# Patient Record
Sex: MALE | Race: WHITE | NOT HISPANIC OR LATINO | Employment: UNEMPLOYED | URBAN - METROPOLITAN AREA
[De-identification: names, ages, dates, MRNs, and addresses within clinical notes are randomized per-mention and may not be internally consistent; named-entity substitution may affect disease eponyms.]

---

## 2017-01-11 ENCOUNTER — RESOLUTE PROFESSIONAL BILLING HOSPITAL PROF FEE (OUTPATIENT)
Dept: CARDIOLOGY | Facility: MEDICAL CENTER | Age: 57
End: 2017-01-11
Payer: MEDICAID

## 2017-01-11 ENCOUNTER — HOSPITAL ENCOUNTER (INPATIENT)
Facility: MEDICAL CENTER | Age: 57
LOS: 1 days | DRG: 316 | End: 2017-01-12
Attending: EMERGENCY MEDICINE | Admitting: INTERNAL MEDICINE
Payer: MEDICAID

## 2017-01-11 ENCOUNTER — APPOINTMENT (OUTPATIENT)
Dept: RADIOLOGY | Facility: MEDICAL CENTER | Age: 57
DRG: 316 | End: 2017-01-11
Attending: EMERGENCY MEDICINE
Payer: MEDICAID

## 2017-01-11 DIAGNOSIS — R10.13 EPIGASTRIC ABDOMINAL PAIN: ICD-10-CM

## 2017-01-11 DIAGNOSIS — R11.2 NAUSEA AND VOMITING, INTRACTABILITY OF VOMITING NOT SPECIFIED, UNSPECIFIED VOMITING TYPE: ICD-10-CM

## 2017-01-11 DIAGNOSIS — D72.829 LEUKOCYTOSIS, UNSPECIFIED TYPE: ICD-10-CM

## 2017-01-11 DIAGNOSIS — R79.89 POSITIVE D-DIMER: ICD-10-CM

## 2017-01-11 DIAGNOSIS — R07.9 CHEST PAIN, UNSPECIFIED TYPE: ICD-10-CM

## 2017-01-11 PROBLEM — F10.10 ALCOHOL ABUSE: Status: ACTIVE | Noted: 2017-01-11

## 2017-01-11 LAB
ALBUMIN SERPL BCP-MCNC: 3.7 G/DL (ref 3.2–4.9)
ALBUMIN SERPL BCP-MCNC: 4.6 G/DL (ref 3.2–4.9)
ALBUMIN/GLOB SERPL: 1.3 G/DL
ALBUMIN/GLOB SERPL: 1.3 G/DL
ALP SERPL-CCNC: 58 U/L (ref 30–99)
ALP SERPL-CCNC: 79 U/L (ref 30–99)
ALT SERPL-CCNC: 18 U/L (ref 2–50)
ALT SERPL-CCNC: 26 U/L (ref 2–50)
ANION GAP SERPL CALC-SCNC: 13 MMOL/L (ref 0–11.9)
ANION GAP SERPL CALC-SCNC: 8 MMOL/L (ref 0–11.9)
APPEARANCE UR: CLEAR
AST SERPL-CCNC: 17 U/L (ref 12–45)
AST SERPL-CCNC: 25 U/L (ref 12–45)
BASOPHILS # BLD AUTO: 0.8 % (ref 0–1.8)
BASOPHILS # BLD: 0.13 K/UL (ref 0–0.12)
BILIRUB SERPL-MCNC: 0.3 MG/DL (ref 0.1–1.5)
BILIRUB SERPL-MCNC: 0.5 MG/DL (ref 0.1–1.5)
BILIRUB UR QL STRIP.AUTO: NEGATIVE
BUN SERPL-MCNC: 10 MG/DL (ref 8–22)
BUN SERPL-MCNC: 11 MG/DL (ref 8–22)
CALCIUM SERPL-MCNC: 8.6 MG/DL (ref 8.5–10.5)
CALCIUM SERPL-MCNC: 9.5 MG/DL (ref 8.5–10.5)
CHLORIDE SERPL-SCNC: 100 MMOL/L (ref 96–112)
CHLORIDE SERPL-SCNC: 107 MMOL/L (ref 96–112)
CO2 SERPL-SCNC: 22 MMOL/L (ref 20–33)
CO2 SERPL-SCNC: 22 MMOL/L (ref 20–33)
COLOR UR: YELLOW
CREAT SERPL-MCNC: 0.78 MG/DL (ref 0.5–1.4)
CREAT SERPL-MCNC: 0.81 MG/DL (ref 0.5–1.4)
CRP SERPL HS-MCNC: 1.4 MG/L (ref 0–7.5)
DEPRECATED D DIMER PPP IA-ACNC: 299 NG/ML(D-DU)
EKG IMPRESSION: NORMAL
EKG IMPRESSION: NORMAL
EOSINOPHIL # BLD AUTO: 0.1 K/UL (ref 0–0.51)
EOSINOPHIL NFR BLD: 0.6 % (ref 0–6.9)
ERYTHROCYTE [DISTWIDTH] IN BLOOD BY AUTOMATED COUNT: 46.7 FL (ref 35.9–50)
ERYTHROCYTE [SEDIMENTATION RATE] IN BLOOD BY WESTERGREN METHOD: 13 MM/HOUR (ref 0–20)
FLUAV H1 2009 PAND RNA SPEC QL NAA+PROBE: NOT DETECTED
FLUAV RNA SPEC QL NAA+PROBE: NEGATIVE
FLUBV RNA SPEC QL NAA+PROBE: NEGATIVE
GFR SERPL CREATININE-BSD FRML MDRD: >60 ML/MIN/1.73 M 2
GFR SERPL CREATININE-BSD FRML MDRD: >60 ML/MIN/1.73 M 2
GLOBULIN SER CALC-MCNC: 2.9 G/DL (ref 1.9–3.5)
GLOBULIN SER CALC-MCNC: 3.6 G/DL (ref 1.9–3.5)
GLUCOSE SERPL-MCNC: 115 MG/DL (ref 65–99)
GLUCOSE SERPL-MCNC: 91 MG/DL (ref 65–99)
GLUCOSE UR STRIP.AUTO-MCNC: NEGATIVE MG/DL
HCT VFR BLD AUTO: 46 % (ref 42–52)
HGB BLD-MCNC: 16.1 G/DL (ref 14–18)
IMM GRANULOCYTES # BLD AUTO: 0.07 K/UL (ref 0–0.11)
IMM GRANULOCYTES NFR BLD AUTO: 0.5 % (ref 0–0.9)
KETONES UR STRIP.AUTO-MCNC: 20 MG/DL
LACTATE BLD-SCNC: 1.7 MMOL/L (ref 0.5–2)
LACTATE BLD-SCNC: 2.1 MMOL/L (ref 0.5–2)
LACTATE BLD-SCNC: 2.4 MMOL/L (ref 0.5–2)
LEUKOCYTE ESTERASE UR QL STRIP.AUTO: NEGATIVE
LIPASE SERPL-CCNC: 25 U/L (ref 11–82)
LV EJECT FRACT  99904: 55
LV EJECT FRACT MOD 2C 99903: 49.11
LV EJECT FRACT MOD 4C 99902: 59.14
LV EJECT FRACT MOD BP 99901: 57.16
LYMPHOCYTES # BLD AUTO: 1.94 K/UL (ref 1–4.8)
LYMPHOCYTES NFR BLD: 12.6 % (ref 22–41)
MAGNESIUM SERPL-MCNC: 2.1 MG/DL (ref 1.5–2.5)
MCH RBC QN AUTO: 34.3 PG (ref 27–33)
MCHC RBC AUTO-ENTMCNC: 35 G/DL (ref 33.7–35.3)
MCV RBC AUTO: 98.1 FL (ref 81.4–97.8)
MICRO URNS: ABNORMAL
MONOCYTES # BLD AUTO: 1.11 K/UL (ref 0–0.85)
MONOCYTES NFR BLD AUTO: 7.2 % (ref 0–13.4)
NEUTROPHILS # BLD AUTO: 12.07 K/UL (ref 1.82–7.42)
NEUTROPHILS NFR BLD: 78.3 % (ref 44–72)
NITRITE UR QL STRIP.AUTO: NEGATIVE
NRBC # BLD AUTO: 0 K/UL
NRBC BLD AUTO-RTO: 0 /100 WBC
PH UR STRIP.AUTO: 5 [PH]
PHOSPHATE SERPL-MCNC: 2.4 MG/DL (ref 2.5–4.5)
PLATELET # BLD AUTO: 252 K/UL (ref 164–446)
PMV BLD AUTO: 9.6 FL (ref 9–12.9)
POTASSIUM SERPL-SCNC: 4.3 MMOL/L (ref 3.6–5.5)
POTASSIUM SERPL-SCNC: 4.4 MMOL/L (ref 3.6–5.5)
PROT SERPL-MCNC: 6.6 G/DL (ref 6–8.2)
PROT SERPL-MCNC: 8.2 G/DL (ref 6–8.2)
PROT UR QL STRIP: NEGATIVE MG/DL
RBC # BLD AUTO: 4.69 M/UL (ref 4.7–6.1)
RBC UR QL AUTO: NEGATIVE
SODIUM SERPL-SCNC: 135 MMOL/L (ref 135–145)
SODIUM SERPL-SCNC: 137 MMOL/L (ref 135–145)
SP GR UR STRIP.AUTO: >1.035
TROPONIN I SERPL-MCNC: <0.01 NG/ML (ref 0–0.04)
WBC # BLD AUTO: 15.4 K/UL (ref 4.8–10.8)

## 2017-01-11 PROCEDURE — 700102 HCHG RX REV CODE 250 W/ 637 OVERRIDE(OP): Performed by: STUDENT IN AN ORGANIZED HEALTH CARE EDUCATION/TRAINING PROGRAM

## 2017-01-11 PROCEDURE — 700111 HCHG RX REV CODE 636 W/ 250 OVERRIDE (IP): Performed by: STUDENT IN AN ORGANIZED HEALTH CARE EDUCATION/TRAINING PROGRAM

## 2017-01-11 PROCEDURE — 85379 FIBRIN DEGRADATION QUANT: CPT

## 2017-01-11 PROCEDURE — 700102 HCHG RX REV CODE 250 W/ 637 OVERRIDE(OP): Performed by: EMERGENCY MEDICINE

## 2017-01-11 PROCEDURE — 96375 TX/PRO/DX INJ NEW DRUG ADDON: CPT

## 2017-01-11 PROCEDURE — 76705 ECHO EXAM OF ABDOMEN: CPT

## 2017-01-11 PROCEDURE — 93306 TTE W/DOPPLER COMPLETE: CPT | Mod: 26 | Performed by: INTERNAL MEDICINE

## 2017-01-11 PROCEDURE — 74177 CT ABD & PELVIS W/CONTRAST: CPT

## 2017-01-11 PROCEDURE — 700105 HCHG RX REV CODE 258: Performed by: EMERGENCY MEDICINE

## 2017-01-11 PROCEDURE — 700102 HCHG RX REV CODE 250 W/ 637 OVERRIDE(OP): Performed by: INTERNAL MEDICINE

## 2017-01-11 PROCEDURE — 84484 ASSAY OF TROPONIN QUANT: CPT

## 2017-01-11 PROCEDURE — 700102 HCHG RX REV CODE 250 W/ 637 OVERRIDE(OP)

## 2017-01-11 PROCEDURE — 302128 INFUSION PUMP: Performed by: INTERNAL MEDICINE

## 2017-01-11 PROCEDURE — 99255 IP/OBS CONSLTJ NEW/EST HI 80: CPT | Performed by: INTERNAL MEDICINE

## 2017-01-11 PROCEDURE — 80053 COMPREHEN METABOLIC PANEL: CPT

## 2017-01-11 PROCEDURE — 93010 ELECTROCARDIOGRAM REPORT: CPT | Performed by: INTERNAL MEDICINE

## 2017-01-11 PROCEDURE — 96374 THER/PROPH/DIAG INJ IV PUSH: CPT

## 2017-01-11 PROCEDURE — A9270 NON-COVERED ITEM OR SERVICE: HCPCS | Performed by: INTERNAL MEDICINE

## 2017-01-11 PROCEDURE — 93306 TTE W/DOPPLER COMPLETE: CPT

## 2017-01-11 PROCEDURE — A9270 NON-COVERED ITEM OR SERVICE: HCPCS

## 2017-01-11 PROCEDURE — 700117 HCHG RX CONTRAST REV CODE 255: Performed by: EMERGENCY MEDICINE

## 2017-01-11 PROCEDURE — 87040 BLOOD CULTURE FOR BACTERIA: CPT

## 2017-01-11 PROCEDURE — 36415 COLL VENOUS BLD VENIPUNCTURE: CPT

## 2017-01-11 PROCEDURE — 83605 ASSAY OF LACTIC ACID: CPT

## 2017-01-11 PROCEDURE — 74022 RADEX COMPL AQT ABD SERIES: CPT

## 2017-01-11 PROCEDURE — 86141 C-REACTIVE PROTEIN HS: CPT

## 2017-01-11 PROCEDURE — 87077 CULTURE AEROBIC IDENTIFY: CPT

## 2017-01-11 PROCEDURE — 770020 HCHG ROOM/CARE - TELE (206)

## 2017-01-11 PROCEDURE — 700105 HCHG RX REV CODE 258: Performed by: INTERNAL MEDICINE

## 2017-01-11 PROCEDURE — 99285 EMERGENCY DEPT VISIT HI MDM: CPT

## 2017-01-11 PROCEDURE — 84100 ASSAY OF PHOSPHORUS: CPT

## 2017-01-11 PROCEDURE — A9270 NON-COVERED ITEM OR SERVICE: HCPCS | Performed by: EMERGENCY MEDICINE

## 2017-01-11 PROCEDURE — 93005 ELECTROCARDIOGRAM TRACING: CPT | Performed by: INTERNAL MEDICINE

## 2017-01-11 PROCEDURE — 85652 RBC SED RATE AUTOMATED: CPT

## 2017-01-11 PROCEDURE — 87502 INFLUENZA DNA AMP PROBE: CPT

## 2017-01-11 PROCEDURE — 83690 ASSAY OF LIPASE: CPT

## 2017-01-11 PROCEDURE — 85025 COMPLETE CBC W/AUTO DIFF WBC: CPT

## 2017-01-11 PROCEDURE — 94760 N-INVAS EAR/PLS OXIMETRY 1: CPT

## 2017-01-11 PROCEDURE — 83735 ASSAY OF MAGNESIUM: CPT

## 2017-01-11 PROCEDURE — A9270 NON-COVERED ITEM OR SERVICE: HCPCS | Performed by: STUDENT IN AN ORGANIZED HEALTH CARE EDUCATION/TRAINING PROGRAM

## 2017-01-11 PROCEDURE — 81003 URINALYSIS AUTO W/O SCOPE: CPT

## 2017-01-11 PROCEDURE — 87503 INFLUENZA DNA AMP PROB ADDL: CPT

## 2017-01-11 PROCEDURE — 93005 ELECTROCARDIOGRAM TRACING: CPT | Performed by: EMERGENCY MEDICINE

## 2017-01-11 PROCEDURE — 700111 HCHG RX REV CODE 636 W/ 250 OVERRIDE (IP): Performed by: EMERGENCY MEDICINE

## 2017-01-11 PROCEDURE — 93005 ELECTROCARDIOGRAM TRACING: CPT

## 2017-01-11 RX ORDER — SODIUM CHLORIDE 9 MG/ML
INJECTION, SOLUTION INTRAVENOUS CONTINUOUS
Status: DISCONTINUED | OUTPATIENT
Start: 2017-01-11 | End: 2017-01-11

## 2017-01-11 RX ORDER — ENEMA 19; 7 G/133ML; G/133ML
1 ENEMA RECTAL
Status: DISCONTINUED | OUTPATIENT
Start: 2017-01-11 | End: 2017-01-12 | Stop reason: HOSPADM

## 2017-01-11 RX ORDER — BISACODYL 10 MG
10 SUPPOSITORY, RECTAL RECTAL
Status: DISCONTINUED | OUTPATIENT
Start: 2017-01-11 | End: 2017-01-12 | Stop reason: HOSPADM

## 2017-01-11 RX ORDER — SODIUM CHLORIDE 9 MG/ML
1000 INJECTION, SOLUTION INTRAVENOUS ONCE
Status: COMPLETED | OUTPATIENT
Start: 2017-01-11 | End: 2017-01-11

## 2017-01-11 RX ORDER — AMOXICILLIN 250 MG
1 CAPSULE ORAL NIGHTLY
Status: DISCONTINUED | OUTPATIENT
Start: 2017-01-11 | End: 2017-01-12 | Stop reason: HOSPADM

## 2017-01-11 RX ORDER — COLCHICINE 0.6 MG/1
0.6 TABLET ORAL 2 TIMES DAILY
Status: DISCONTINUED | OUTPATIENT
Start: 2017-01-11 | End: 2017-01-12 | Stop reason: HOSPADM

## 2017-01-11 RX ORDER — AMOXICILLIN 250 MG
1 CAPSULE ORAL
Status: DISCONTINUED | OUTPATIENT
Start: 2017-01-11 | End: 2017-01-12 | Stop reason: HOSPADM

## 2017-01-11 RX ORDER — SODIUM CHLORIDE 9 MG/ML
30 INJECTION, SOLUTION INTRAVENOUS
Status: DISCONTINUED | OUTPATIENT
Start: 2017-01-11 | End: 2017-01-11

## 2017-01-11 RX ORDER — LACTULOSE 20 G/30ML
30 SOLUTION ORAL
Status: DISCONTINUED | OUTPATIENT
Start: 2017-01-11 | End: 2017-01-12 | Stop reason: HOSPADM

## 2017-01-11 RX ORDER — ACETAMINOPHEN 325 MG/1
650 TABLET ORAL EVERY 6 HOURS PRN
Status: DISCONTINUED | OUTPATIENT
Start: 2017-01-11 | End: 2017-01-12 | Stop reason: HOSPADM

## 2017-01-11 RX ORDER — ASPIRIN 81 MG/1
324 TABLET, CHEWABLE ORAL ONCE
Status: COMPLETED | OUTPATIENT
Start: 2017-01-11 | End: 2017-01-11

## 2017-01-11 RX ORDER — OMEPRAZOLE 20 MG/1
20 CAPSULE, DELAYED RELEASE ORAL DAILY
Status: DISCONTINUED | OUTPATIENT
Start: 2017-01-11 | End: 2017-01-12 | Stop reason: HOSPADM

## 2017-01-11 RX ORDER — MORPHINE SULFATE 4 MG/ML
4 INJECTION, SOLUTION INTRAMUSCULAR; INTRAVENOUS ONCE
Status: COMPLETED | OUTPATIENT
Start: 2017-01-11 | End: 2017-01-11

## 2017-01-11 RX ORDER — LABETALOL HYDROCHLORIDE 5 MG/ML
10 INJECTION, SOLUTION INTRAVENOUS EVERY 4 HOURS PRN
Status: DISCONTINUED | OUTPATIENT
Start: 2017-01-11 | End: 2017-01-12 | Stop reason: HOSPADM

## 2017-01-11 RX ORDER — IBUPROFEN 600 MG/1
600 TABLET ORAL 3 TIMES DAILY
Status: DISCONTINUED | OUTPATIENT
Start: 2017-01-11 | End: 2017-01-12 | Stop reason: HOSPADM

## 2017-01-11 RX ORDER — SODIUM CHLORIDE 9 MG/ML
INJECTION, SOLUTION INTRAVENOUS CONTINUOUS
Status: DISCONTINUED | OUTPATIENT
Start: 2017-01-11 | End: 2017-01-12 | Stop reason: HOSPADM

## 2017-01-11 RX ORDER — DOCUSATE SODIUM 100 MG/1
100 CAPSULE, LIQUID FILLED ORAL EVERY MORNING
Status: DISCONTINUED | OUTPATIENT
Start: 2017-01-11 | End: 2017-01-12 | Stop reason: HOSPADM

## 2017-01-11 RX ORDER — ONDANSETRON 2 MG/ML
4 INJECTION INTRAMUSCULAR; INTRAVENOUS ONCE
Status: COMPLETED | OUTPATIENT
Start: 2017-01-11 | End: 2017-01-11

## 2017-01-11 RX ORDER — SODIUM CHLORIDE 9 MG/ML
500 INJECTION, SOLUTION INTRAVENOUS
Status: DISCONTINUED | OUTPATIENT
Start: 2017-01-11 | End: 2017-01-11

## 2017-01-11 RX ADMIN — MORPHINE SULFATE 4 MG: 4 INJECTION INTRAVENOUS at 03:28

## 2017-01-11 RX ADMIN — NICOTINE 7 MG: 7 PATCH TRANSDERMAL at 21:37

## 2017-01-11 RX ADMIN — SODIUM CHLORIDE 1000 ML: 9 INJECTION, SOLUTION INTRAVENOUS at 05:44

## 2017-01-11 RX ADMIN — IOHEXOL 100 ML: 350 INJECTION, SOLUTION INTRAVENOUS at 04:52

## 2017-01-11 RX ADMIN — ACETAMINOPHEN 650 MG: 325 TABLET, FILM COATED ORAL at 17:42

## 2017-01-11 RX ADMIN — SODIUM CHLORIDE 1000 ML: 9 INJECTION, SOLUTION INTRAVENOUS at 04:00

## 2017-01-11 RX ADMIN — ASPIRIN 324 MG: 81 TABLET, CHEWABLE ORAL at 01:28

## 2017-01-11 RX ADMIN — COLCHICINE 0.6 MG: 0.6 TABLET, FILM COATED ORAL at 21:41

## 2017-01-11 RX ADMIN — ENOXAPARIN SODIUM 40 MG: 100 INJECTION SUBCUTANEOUS at 17:42

## 2017-01-11 RX ADMIN — SODIUM CHLORIDE: 9 INJECTION, SOLUTION INTRAVENOUS at 11:01

## 2017-01-11 RX ADMIN — SODIUM CHLORIDE: 9 INJECTION, SOLUTION INTRAVENOUS at 19:13

## 2017-01-11 RX ADMIN — LIDOCAINE HYDROCHLORIDE 30 ML: 20 SOLUTION OROPHARYNGEAL at 05:41

## 2017-01-11 RX ADMIN — IBUPROFEN 600 MG: 600 TABLET, FILM COATED ORAL at 21:41

## 2017-01-11 RX ADMIN — ONDANSETRON 4 MG: 2 INJECTION, SOLUTION INTRAMUSCULAR; INTRAVENOUS at 03:28

## 2017-01-11 RX ADMIN — OMEPRAZOLE 20 MG: 20 CAPSULE, DELAYED RELEASE ORAL at 17:42

## 2017-01-11 RX ADMIN — SODIUM CHLORIDE 1000 ML: 9 INJECTION, SOLUTION INTRAVENOUS at 03:28

## 2017-01-11 ASSESSMENT — LIFESTYLE VARIABLES
PAROXYSMAL SWEATS: NO SWEAT VISIBLE
TOTAL SCORE: 2
DOES PATIENT WANT TO TALK TO SOMEONE ABOUT QUITTING: YES
TOTAL SCORE: 2
SUBSTANCE_ABUSE: 0
EVER_SMOKED: YES
ALCOHOL_USE: YES
VISUAL DISTURBANCES: NOT PRESENT
AGITATION: NORMAL ACTIVITY
ANXIETY: MILDLY ANXIOUS
HAVE PEOPLE ANNOYED YOU BY CRITICIZING YOUR DRINKING: NO
TREMOR: *
DOES PATIENT WANT TO STOP DRINKING: YES
EVER FELT BAD OR GUILTY ABOUT YOUR DRINKING: NO
HAVE YOU EVER FELT YOU SHOULD CUT DOWN ON YOUR DRINKING: YES
ORIENTATION AND CLOUDING OF SENSORIUM: ORIENTED AND CAN DO SERIAL ADDITIONS
TOTAL SCORE: 2
EVER HAD A DRINK FIRST THING IN THE MORNING TO STEADY YOUR NERVES TO GET RID OF A HANGOVER: YES
HEADACHE, FULLNESS IN HEAD: VERY MILD
CONSUMPTION TOTAL: POSITIVE
HOW MANY TIMES IN THE PAST YEAR HAVE YOU HAD 5 OR MORE DRINKS IN A DAY: 5
AVERAGE NUMBER OF DAYS PER WEEK YOU HAVE A DRINK CONTAINING ALCOHOL: 5
TOTAL SCORE: 4
EVER_SMOKED: YES
ON A TYPICAL DAY WHEN YOU DRINK ALCOHOL HOW MANY DRINKS DO YOU HAVE: 3
AUDITORY DISTURBANCES: NOT PRESENT
NAUSEA AND VOMITING: NO NAUSEA AND NO VOMITING

## 2017-01-11 ASSESSMENT — ENCOUNTER SYMPTOMS
DIARRHEA: 0
HEADACHES: 0
FEVER: 0
COUGH: 1
CONSTIPATION: 0
PND: 0
ABDOMINAL PAIN: 1
SPUTUM PRODUCTION: 0
BACK PAIN: 0
DOUBLE VISION: 0
HEARTBURN: 0
HEMOPTYSIS: 0
NECK PAIN: 0
TREMORS: 0
TINGLING: 0
CHILLS: 0
ORTHOPNEA: 0
PHOTOPHOBIA: 0
MYALGIAS: 0
PALPITATIONS: 0
SHORTNESS OF BREATH: 0
CLAUDICATION: 0
DEPRESSION: 0
VOMITING: 1
BLURRED VISION: 0
NAUSEA: 1
WHEEZING: 0
DIZZINESS: 0

## 2017-01-11 ASSESSMENT — PAIN SCALES - GENERAL
PAINLEVEL_OUTOF10: 10
PAINLEVEL_OUTOF10: 3
PAINLEVEL_OUTOF10: 10
PAINLEVEL_OUTOF10: 2

## 2017-01-11 ASSESSMENT — COPD QUESTIONNAIRES
DURING THE PAST 4 WEEKS HOW MUCH DID YOU FEEL SHORT OF BREATH: NONE/LITTLE OF THE TIME
COPD SCREENING SCORE: 3
HAVE YOU SMOKED AT LEAST 100 CIGARETTES IN YOUR ENTIRE LIFE: YES
DO YOU EVER COUGH UP ANY MUCUS OR PHLEGM?: NO/ONLY WITH OCCASIONAL COLDS OR INFECTIONS

## 2017-01-11 NOTE — CONSULTS
Reason of Consult: Abdominal pain and vometing    Consulting Physician: Dr. Rdz    HPI:  56M alcoholic with HTN and smoking, intermittently homeless, presents with constant epigastric pain worsening for 10-12 hours associated with N/V. On arrival noted to have normal troponin and an elevated WBC. Repeat ekg was felt concerning for ACS. Patient has voluntary and involuntary guarding of his abdomen with rebound tenderness. Belly pain radiates to right chest slightly, and is made much worse with respiration and positional changes.    History reviewed. No pertinent past medical history.    Past Surgical History   Procedure Laterality Date   • Hernia repair       inguinal hernia repair at age 4 and age 7    • Other     • Submandible abscess incision and drainage  3/25/2012     Performed by JORGE LUIS OROSCO at SURGERY [facility] ORS   • Dental extraction(s)  3/25/2012     Performed by JORGE LUIS OROSCO at SURGERY [facility] OR[facility]       No current facility-administered medications on file prior to encounter.     Current Outpatient Prescriptions on File Prior to Encounter   Medication Sig Dispense Refill   • lisinopril (PRINIVIL) 5 MG TABS Take 1 Tab by mouth every day. 30 Each 0   • nicotine (NICODERM) 21 MG/24HR PT24 Apply 1 Patch to skin as directed every day. 30 Each 0   • thiamine 100 MG tablet Take 1 Tab by mouth every day. 30 Each 0   • folic acid (FOLVITE) 1 MG TABS Take 1 Tab by mouth every day. 30 Each 0   • amoxicillin-clavulanate (AUGMENTIN) 500-125 MG TABS Take 1 Tab by mouth 3 times a day. 15 Each 0   • metoprolol (LOPRESSOR) 25 MG TABS Take 1 Tab by mouth 2 times a day. 60 Each 0       Current Facility-Administered Medications   Medication Dose Frequency Provider Last Rate Last Dose   • NS infusion 1,000 mL  1,000 mL Once Thien Hidalgo M.D.       • morphine (pf) 4 mg/ml injection 4 mg  4 mg Once Thien Hidalgo M.D.       • ondansetron (ZOFRAN) syringe/vial injection 4 mg  4 mg Once Thien WHITT  "BRODERICK Hidalgo         Last reviewed on 1/11/2017  1:31 AM by Kami Orozco R.N.    Review of patient's allergies indicates no known allergies.    History reviewed. No pertinent family history.    ROS: As HPI other reviewed and negative     Physical Exam   Blood pressure 111/79, pulse 107, temperature 36.2 °C (97.2 °F), resp. rate 34, height 1.854 m (6' 1\"), weight 65.9 kg (145 lb 4.5 oz), SpO2 93 %.    Constitutional: Elderly, thin. Appears well-developed.   HENT: Normocephalic and atraumatic. No scleral icterus.   Neck: No JVD present.   Cardiovascular: Normal rate. Exam reveals no gallop and no friction rub. No murmur heard.   Pulmonary/Chest: CTAB   Abdominal: Firm, TTP RUQ/RLQ with guarding and rebound tenderness, BS reduced but present.  Musculoskeletal: Exhibits no edema. Pulses present.  Skin: Skin is warm and dry.   Neuro: Non-focal, CN 2-12 intact grossly    No intake or output data in the 24 hours ending 01/11/17 0328    Recent Labs      01/11/17   0051   WBC  15.4*   RBC  4.69*   HEMOGLOBIN  16.1   HEMATOCRIT  46.0   MCV  98.1*   MCH  34.3*   MCHC  35.0   RDW  46.7   PLATELETCT  252   MPV  9.6     Recent Labs      01/11/17   0051   SODIUM  135   POTASSIUM  4.3   CHLORIDE  100   CO2  22   GLUCOSE  91   BUN  10   CREATININE  0.81   CALCIUM  9.5             Recent Labs      01/11/17   0051   TROPONINI  <0.01           EKG (1/11/2017):  I have personally reviewed the EKG this visit and discussed with the patient. It shows  with borderline ST elevation inferior, lateral and anterior with MT elevation in aVR. Does not meet STEMI criteria at the moment.    Imaging reviewed    Impressions:  1. Acute abdominal pain  2. Tobacco abuse  3. HTN    Recommendations:  His ECG is more reminiscent of pericarditis and he has had 10-12 hours of symptoms without an elevation of his troponins. No significant chest pain, but clear acute abdominal pain.    1. Repeat troponins  2. Evaluate acute abdomen  3. Echo  4. " Aspirin    Further recommendations based on above.      Thank you for this interesting consultation. It was my pleasure to see Heriberto Jenkins today.

## 2017-01-11 NOTE — ED PROVIDER NOTES
"ED Provider Note    CHIEF COMPLAINT  Chief Complaint   Patient presents with   • Abdominal Cramping     pt states \"abdominal cramping after I ate some chicken I didn't cook all the way.\"   • Vomiting     x1       HPI  Heriberto Jenkins is a 56 y.o. male who presents severe epigastric abdominal pain radiating up to his chest as well as nausea and vomiting. Patient thinks that this started after he ate some chicken that was not cooked all the way. Patient's describes the pain as a sharp pain associated with some slight shortness of breath. He says he has radiation up to the left side of his neck and his left shoulder as well. He denies any diaphoresis. He is currently nauseated and has thrown up several times. He denies any melena or hematochezia, any blood in his vomit. Nothing seems to make the pain better or worse. He's never had pain like this before. Patient does have a history of alcohol abuse but denies any history of gastritis or esophageal varices. He denies any history of coronary artery disease, pulmonary embolism, you not leg swelling or calf pain.    REVIEW OF SYSTEMS  See HPI for further details. All other systems are negative.     PAST MEDICAL HISTORY       SOCIAL HISTORY  Social History     Social History Main Topics   • Smoking status: Current Every Day Smoker -- 1.00 packs/day     Types: Cigarettes   • Smokeless tobacco: Not on file   • Alcohol Use: Yes      Comment: daily beer approx 6 to 7 and vodka (\"(If I have no beer\") approx 1/4 bottle   • Drug Use: Not on file   • Sexual Activity: Not on file       SURGICAL HISTORY   has past surgical history that includes hernia repair; other; submandible abscess incision and drainage (3/25/2012); and dental extraction(s) (3/25/2012).    CURRENT MEDICATIONS  Home Medications     Reviewed by Kami Orozco R.N. (Registered Nurse) on 01/11/17 at 0131  Med List Status: Partial    Medication Last Dose Status    amoxicillin-clavulanate (AUGMENTIN) 500-125 MG " "TABS not taking Active    folic acid (FOLVITE) 1 MG TABS not taking Active    lisinopril (PRINIVIL) 5 MG TABS not taking Active    metoprolol (LOPRESSOR) 25 MG TABS not taking Active    nicotine (NICODERM) 21 MG/24HR PT24 not taking Active    thiamine 100 MG tablet not taking Active                ALLERGIES  No Known Allergies    PHYSICAL EXAM  VITAL SIGNS: /79 mmHg  Pulse 113  Temp(Src) 37.9 °C (100.2 °F)  Resp 32  Ht 1.854 m (6' 1\")  Wt 65.9 kg (145 lb 4.5 oz)  BMI 19.17 kg/m2  SpO2 97%   Constitutional: Well developed, Well nourished, moderate distress.   HENT: Normocephalic, Atraumatic, Oropharynx moist, No oral exudates.   Eyes: Conjunctiva normal, No discharge.   Neck: Supple, No stridor, no carotid bruit  Cardiovascular: Tachycardia No murmurs, equal pulses.   Pulmonary: Normal breath sounds, No respiratory distress, No wheezing, No rales, No rhonchi.  Chest: No chest wall tenderness or deformity.   Abdomen:Soft, patient has moderate tenderness in the epigastric and right upper quadrant, questionable Barrow sign No  masses, no rebound, no guarding.   Back: No CVA tenderness.   Musculoskeletal: No major deformities noted, No tenderness.   Skin: Warm, Dry, No erythema, No rash.   Neurologic: Alert & oriented x 3, Normal motor function,  No focal deficits noted.   Psychiatric: Affect normal, Judgment normal, Mood normal.        EKG  1st EKG done at 00 50 8 AM shows sinus tachycardia rate of 107, slightly leftward axis, slight ST elevation less than 1 mm, in lead 2, 3 and aVF. He also has ST elevation in V5, V6 with some T-wave flattening in aVL compared to previous EKG done March 25, 2012 ST elevation slightly new T-wave inversion is also new. EKG changes possible early ischemia.     Repeat EKG done at 3 AM shows slightly increased ST elevation, 2, V5, V6 with some slight ST depression in aVR continue T-wave inversion in aVL interpretation possible inferior lateral acute " MI    RADIOLOGY/PROCEDURES  US-GALLBLADDER   Final Result         1.  Mild hepatomegaly with echogenic liver suggests fatty change versus fibrosis.   2.  Mild atherosclerosis.   3.  Borderline gallbladder wall thickening, otherwise unremarkable gallbladder         CT-ABDOMEN-PELVIS WITH   Final Result         1.  No acute abnormality.   2.  Trace pericardial effusion   3.  Hepatomegaly and diffuse hepatic steatosis.   4.  Atherosclerosis and atherosclerotic coronary artery disease   5.  Diverticulosis      DX-ABDOMEN COMPLETE WITH AP OR PA CXR   Final Result         1.  No acute cardiopulmonary disease is evident.   2.  Hyperexpansion of lungs compatible with changes of COPD.   3.  Nonspecific bowel gas pattern.          Laboratory tests  Results for orders placed or performed during the hospital encounter of 01/11/17   CBC WITH DIFFERENTIAL   Result Value Ref Range    WBC 15.4 (H) 4.8 - 10.8 K/uL    RBC 4.69 (L) 4.70 - 6.10 M/uL    Hemoglobin 16.1 14.0 - 18.0 g/dL    Hematocrit 46.0 42.0 - 52.0 %    MCV 98.1 (H) 81.4 - 97.8 fL    MCH 34.3 (H) 27.0 - 33.0 pg    MCHC 35.0 33.7 - 35.3 g/dL    RDW 46.7 35.9 - 50.0 fL    Platelet Count 252 164 - 446 K/uL    MPV 9.6 9.0 - 12.9 fL    Neutrophils-Polys 78.30 (H) 44.00 - 72.00 %    Lymphocytes 12.60 (L) 22.00 - 41.00 %    Monocytes 7.20 0.00 - 13.40 %    Eosinophils 0.60 0.00 - 6.90 %    Basophils 0.80 0.00 - 1.80 %    Immature Granulocytes 0.50 0.00 - 0.90 %    Nucleated RBC 0.00 /100 WBC    Neutrophils (Absolute) 12.07 (H) 1.82 - 7.42 K/uL    Lymphs (Absolute) 1.94 1.00 - 4.80 K/uL    Monos (Absolute) 1.11 (H) 0.00 - 0.85 K/uL    Eos (Absolute) 0.10 0.00 - 0.51 K/uL    Baso (Absolute) 0.13 (H) 0.00 - 0.12 K/uL    Immature Granulocytes (abs) 0.07 0.00 - 0.11 K/uL    NRBC (Absolute) 0.00 K/uL   COMP METABOLIC PANEL   Result Value Ref Range    Sodium 135 135 - 145 mmol/L    Potassium 4.3 3.6 - 5.5 mmol/L    Chloride 100 96 - 112 mmol/L    Co2 22 20 - 33 mmol/L    Anion Gap  13.0 (H) 0.0 - 11.9    Glucose 91 65 - 99 mg/dL    Bun 10 8 - 22 mg/dL    Creatinine 0.81 0.50 - 1.40 mg/dL    Calcium 9.5 8.5 - 10.5 mg/dL    AST(SGOT) 25 12 - 45 U/L    ALT(SGPT) 26 2 - 50 U/L    Alkaline Phosphatase 79 30 - 99 U/L    Total Bilirubin 0.3 0.1 - 1.5 mg/dL    Albumin 4.6 3.2 - 4.9 g/dL    Total Protein 8.2 6.0 - 8.2 g/dL    Globulin 3.6 (H) 1.9 - 3.5 g/dL    A-G Ratio 1.3 g/dL   LIPASE   Result Value Ref Range    Lipase 25 11 - 82 U/L   TROPONIN   Result Value Ref Range    Troponin I <0.01 0.00 - 0.04 ng/mL   ESTIMATED GFR   Result Value Ref Range    GFR If African American >60 >60 mL/min/1.73 m 2    GFR If Non African American >60 >60 mL/min/1.73 m 2   TROPONIN   Result Value Ref Range    Troponin I <0.01 0.00 - 0.04 ng/mL   D-DIMER   Result Value Ref Range    D-Dimer Screen 299 (H) <250 ng/mL(D-DU)   LACTIC ACID   Result Value Ref Range    Lactic Acid 2.4 (H) 0.5 - 2.0 mmol/L   EKG (ER)   Result Value Ref Range    Report       [facility]    Test Date:  2017  Pt Name:    MINA VARGHESE              Department: ER  MRN:        [id number]                      Room:  Gender:     M                            Technician: [id number]  :        1960                   Requested By:ER TRIAGE PROTOCOL  Order #:    [id number]                    Reading MD:    Measurements  Intervals                                Axis  Rate:       107                          P:          50  VT:         172                          QRS:        0  QRSD:       80                           T:          70  QT:         348  QTc:        465    Interpretive Statements  SINUS TACHYCARDIA  ST ELEVATION, PROBABLE INFERIOR INJURY  BORDERLINE R WAVE PROGRESSION, ANTERIOR LEADS  Compared to ECG 2012 09:04:19  ST (T wave) deviation now present  Myocardial infarct finding now present     EKG (ER)   Result Value Ref Range    Report       [facility] Emergency [facility].    Test  Date:  2017  Pt Name:    MINA VARGHESE              Department: ER  MRN:        [id number]                      Room:       RD 11  Gender:     M                            Technician: [id number]  :        1960                   Requested By:JAVID QUINTANA  Order #:    [id number]                    Reading MD:    Measurements  Intervals                                Axis  Rate:       111                          P:          56  IA:         176                          QRS:        4  QRSD:       90                           T:          68  QT:         328  QTc:        446    Interpretive Statements  SINUS TACHYCARDIA  ST ELEVATION, PROBABLE INFERIOR INJURY  LATERAL LEADS ARE ALSO INVOLVED  Compared to ECG 2017 00:58:15  No significant changes           COURSE & MEDICAL DECISION MAKING  Pertinent Labs & Imaging studies reviewed. (See chart for details)  3 AM Immediately upon seeing the patient and ordered a repeat EKG for his continued chest pain. This is concerning for possible myocardial infarction in the inferior lateral leads therefore immediately called and discussed the case with cardiology Dr. Justice who immediately came and saw the patient. After evaluation the patient and review the patient's EKGs as well as his 1st troponin it was felt that this is not a acute ST elevation myocardial infarction but possibly pericarditis. He recommended patient be admitted for telemetry as well his repeat enzymes. He is also concerned about the patient's abdominal pain and felt that this may be more of a cause of his abdominal and chest pain and needed further evaluation.    Because the patient's significant abdominal pain CT of the abdomen and pelvis was done this is not show any acute findings patient continued pain and tenderness over the right lower quadrant I felt he could have cholecystitis therefore ultrasound of the right upper quadrant was done. I also did d-dimer because the patient continue  be tachycardic as well as having some shortness of breath. D-dimer slightly elevated and ultrasound was equivocal with borderline gallbladder wall thickening. On reexamination at 6 AM patient's right upper quadrant pain has gone away. He still is having occasional shortness of breath and sharp chest pain. Given the fact the patient has an elevated d-dimer I feel the patient would benefit from admission with continued observation and probable VQ scan versus repeat CAT scan of the chest in 24 hours. Patient had already had IV contrast dye load therefore CT of the chest was not able to be done for fear of hurting his kidney function. Medical decision making patient presented with severe abdominal pain and vomiting as well as chest pain. The exact etiology at this point time is unclear think the patient would benefit from admission with repeat enzymes possibly stress test and possibly a CT a chest throughout pulmonary embolism versus VQ scan. Maybe pericarditis given the fact patient has a slight pericardial effusion and EKG changes with a diffuse ST elevation. His initial troponins are negative. Patient was given an aspirin. His pain has been slightly improved with GI cocktail and narcotics.          FINAL IMPRESSION  1. Epigastric abdominal pain    2. Chest pain, unspecified type    3. Leukocytosis, unspecified type    4. Nausea and vomiting, intractability of vomiting not specified, unspecified vomiting type    5. Positive D-dimer     6. Critical care of 30 minutes including discussion with multiple consultants, review the patient's labs as well as repeat evaluation of the patient's chest pain abdominal pain.           Electronically signed by: Thien Hidalgo, 1/11/2017 1:46 AM    This record was made with a voice recognition software. The software is not perfect. I have tried to correct any grammar, spelling or context errors to the best of my ability, but errors may still remain. Interpretation of this chart  should be taken in this context.

## 2017-01-11 NOTE — ED NOTES
"Chief Complaint   Patient presents with   • Abdominal Cramping     pt states \"abdominal cramping after I ate some chicken I didn't cook all the way.\"   • Vomiting     x1     Pt BIB by EMS to triage with above complaints. Slightly tachycardic, otherwise VSS. Pt educated on triage process, placed in lobby, instructed to notify staff of any issues.    /79 mmHg  Pulse 106  Temp(Src) 36.2 °C (97.2 °F)  Resp 18  Ht 1.854 m (6' 1\")  Wt 65.9 kg (145 lb 4.5 oz)  BMI 19.17 kg/m2  SpO2 96%    "

## 2017-01-11 NOTE — ED NOTES
Patient resting in bed.  Remains Tachycardiac.  Reporting epigastric pain, radiating to between shoulder blades. Low grade fever at 100.2

## 2017-01-11 NOTE — IP AVS SNAPSHOT
1/12/2017          Heriberto Jenkins  [address]  [place] [address]    Dear Heriberto:    [facility] wants to ensure your discharge home is safe and you or your loved ones have had all your questions answered regarding your care after you leave the hospital.    You may receive a telephone call within two days of your discharge.  This call is to make certain you understand your discharge instructions as well as ensure we provided you with the best care possible during your stay with us.     The call will only last approximately 3-5 minutes and will be done by a nurse.    Once again, we want to ensure your discharge home is safe and that you have a clear understanding of any next steps in your care.  If you have any questions or concerns, please do not hesitate to contact us, we are here for you.  Thank you for choosing [facility] for your healthcare needs.    Sincerely,    Javon Alonzo    [facility]

## 2017-01-11 NOTE — IP AVS SNAPSHOT
" <p align=\"LEFT\"><IMG SRC=\"[email or web address]\" alt=\"Image\" WIDTH=\"50%\" HEIGHT=\"200\" BORDER=\"\"></p>                   Name:Heriberto Jenkins  Medical Record Number:[id number]  CSN: [id number]    YOB: 1960   Age: 56 y.o.  Sex: male  HT:1.854 m (6' 1\") WT: 69.6 kg (153 lb 7 oz)          Admit Date: 1/11/2017     Discharge Date:   Today's Date: 1/12/2017  Attending Doctor:  WILBERTO Bhatia                  Allergies:  Review of patient's allergies indicates no known allergies.             Medication List      Take these Medications        Instructions    colchicine 0.6 MG Tabs   Commonly known as:  COLCRYS    Take 1 Tab by mouth every day.   Dose:  0.6 mg       ibuprofen 600 MG Tabs   Commonly known as:  MOTRIN    Take 1 Tab by mouth 3 times a day.   Dose:  600 mg       nicotine 7 MG/24HR Pt24   Commonly known as:  NICODERM    Apply 1 Patch to skin as directed every 24 hours for 30 days.   Dose:  1 Patch       omeprazole 20 MG delayed-release capsule   Commonly known as:  PRILOSEC    Take 1 Cap by mouth every day.   Dose:  20 mg       sucralfate 1 GM Tabs   Commonly known as:  CARAFATE    Take 1 Tab by mouth 4 Times a Day,Before Meals and at Bedtime.   Dose:  1 g         "

## 2017-01-11 NOTE — IP AVS SNAPSHOT
[employer] Access Code: [email or web address]  Expires: 2/11/2017  3:45 PM    Your email address is not on file at [employer].  Email Addresses are required for you to sign up for [employer], please contact [phone] to verify your personal information and to provide your email address prior to attempting to register for [employer].    Heriberto Jenkins  [address]  [place] [address]    W[employer] With [employer]  A secure, online tool to manage your health information     [employer]’s W[employer] With Style® is a secure, online tool that connects you to your personalized health information from the privacy of your home -- day or night - making it very easy for you to manage your healthcare. Once the activation process is completed, you can even access your medical information using the Woo With Style taty, which is available for free in the Apple Taty store or Google Play store.     To learn more about Woo With Style, visit [email or web address]    There are two levels of access available (as shown below):   My Chart Features  [facility] Primary Care Doctor [facility]  Specialists [facility]  Urgent  Care Non-[facility] Primary Care Doctor   Email your healthcare team securely and privately 24/7 X X X    Manage appointments: schedule your next appointment; view details of past/upcoming appointments X      Request prescription refills. X      View recent personal medical records, including lab and immunizations X X X X   View health record, including health history, allergies, medications X X X X   Read reports about your outpatient visits, procedures, consult and ER notes X X X X   See your discharge summary, which is a recap of your hospital and/or ER visit that includes your diagnosis, lab results, and care plan X X  X     How to register for [employer]:  Once your e-mail address has been verified, follow the following steps to sign up for Pay[employer]pt.     1. Go to  [email or web address]  2. Click on the Sign Up Now box, which takes you to the New Member Sign  Up page. You will need to provide the following information:  a. Enter your [employer] Access Code exactly as it appears at the top of this page. (You will not need to use this code after you’ve completed the sign-up process. If you do not sign up before the expiration date, you must request a new code.)   b. Enter your date of birth.   c. Enter your home email address.   d. Click Submit, and follow the next screen’s instructions.  3. Create a [employer] ID. This will be your [employer] login ID and cannot be changed, so think of one that is secure and easy to remember.  4. Create a [employer] password. You can change your password at any time.  5. Enter your Password Reset Question and Answer. This can be used at a later time if you forget your password.   6. Enter your e-mail address. This allows you to receive e-mail notifications when new information is available in [employer].  7. Click Sign Up. You can now view your health information.    For assistance activating your [employer] account, call [phone]

## 2017-01-11 NOTE — IP AVS SNAPSHOT
" After Visit Summary                                                                                                                  Name:Heriberto Jenkins  Medical Record Number:[id number]  CSN: [id number]    YOB: 1960   Age: 56 y.o.  Sex: male  HT:1.854 m (6' 1\") WT: 69.6 kg (153 lb 7 oz)          Admit Date: 1/11/2017     Discharge Date:   Today's Date: 1/12/2017  Attending Doctor:  WILBERTO Bhatia                  Allergies:  Review of patient's allergies indicates no known allergies.            Discharge Instructions       Discharge Instructions    Discharged to home by bus with provided bus pass with self. Discharged via walking, hospital escort: Refused.  Special equipment needed: Not Applicable    Be sure to schedule a follow-up appointment with your primary care doctor or any specialists as instructed.     Discharge Plan:   Diet Plan: Discussed  Activity Level: Discussed  Smoking Cessation Offered: Patient Counseled  Confirmed Follow up Appointment: Patient to Call and Schedule Appointment  Confirmed Symptoms Management: Discussed  Medication Reconciliation Updated: Yes  Influenza Vaccine Indication: Patient Refuses    I understand that a diet low in cholesterol, fat, and sodium is recommended for good health. Unless I have been given specific instructions below for another diet, I accept this instruction as my diet prescription.   Other diet: Cardiac Heart Healthy    Special Instructions: None    · Is patient discharged on Warfarin / Coumadin?   No     · Is patient Post Blood Transfusion?  No    Depression / Suicide Risk    As you are discharged from this [facility] facility, it is important to learn how to keep safe from harming yourself.    Recognize the warning signs:  · Abrupt changes in personality, positive or negative- including increase in energy   · Giving away possessions  · Change in eating patterns- significant weight changes-  positive or negative  · Change in " sleeping patterns- unable to sleep or sleeping all the time   · Unwillingness or inability to communicate  · Depression  · Unusual sadness, discouragement and loneliness  · Talk of wanting to die  · Neglect of personal appearance   · Rebelliousness- reckless behavior  · Withdrawal from people/activities they love  · Confusion- inability to concentrate     If you or a loved one observes any of these behaviors or has concerns about self-harm, here's what you can do:  · Talk about it- your feelings and reasons for harming yourself  · Remove any means that you might use to hurt yourself (examples: pills, rope, extension cords, firearm)  · Get professional help from the community (Mental Health, Substance Abuse, psychological counseling)  · Do not be alone:Call your Safe Contact- someone whom you trust who will be there for you.  · Call your local CRISIS HOTLINE 971-2187 or 216-805-9362  · Call your local Children's Mobile Crisis Response Team [place] (540) 394-0670 or www.[employer]  · Call the toll free National Suicide Prevention Hotlines   · National Suicide Prevention Lifeline 849-939-YPPT (2121)  · National Hope Line Network 800-SUICIDE (618-5821)    Chest Pain, Nonspecific  It is often hard to give a specific diagnosis for the cause of chest pain. There is always a chance that your pain could be related to something serious, like a heart attack or a blood clot in the lungs. You need to follow up with your caregiver for further evaluation. More lab tests or other studies such as X-rays, electrocardiography, stress testing, or cardiac imaging may be needed to find the cause of your pain.  Most of the time, nonspecific chest pain improves within 2 to 3 days with rest and mild pain medicine. For the next few days, avoid physical exertion or activities that bring on pain. Do not smoke. Avoid drinking alcohol. Call your caregiver for routine follow-up as advised.   SEEK IMMEDIATE MEDICAL CARE IF:  · You  develop increased chest pain or pain that radiates to the arm, neck, jaw, back, or abdomen.   · You develop shortness of breath, increased coughing, or you start coughing up blood.   · You have severe back or abdominal pain, nausea, or vomiting.   · You develop severe weakness, fainting, fever, or chills.   Document Released: 12/18/2006 Document Revised: 03/11/2013 Document Reviewed: 06/06/2008  [employer]® Patient Information ©2013 [employer].    Heartburn  Heartburn is a painful, burning feeling in the chest. It may feel worse when you lie down or bend over. Heartburn is caused by stomach acid moving into the tube that carries food from the mouth to the stomach (esophagus).  HOME CARE  · Take all medicine as told by your doctor.  · Raise the head of your bed with blocks only as told by your doctor.  · Do not exercise right after eating.  · Avoid eating 2 or 3 hours before bed. Do not lie down right after eating.  · Eat small meals throughout the day instead of 3 large meals.  · Stop smoking if you smoke.  · Keep up a healthy weight.  · Avoid foods that give you heartburn. Foods you may want to avoid include:  · Peppers.  · Chocolate.  · High-fat foods, including fried foods.  · Spicy foods.  · Garlic and onions.  · Citrus fruits, including oranges, grapefruit, penny, and limes.  · Food containing tomatoes or tomato products.  · Mint.  · Bubbly (carbonated) drinks and drinks with caffeine.  · Vinegar.  GET HELP RIGHT AWAY IF:  · You have bad chest pain that goes down your arm or into your jaw or neck.  · You feel sweaty, dizzy, or lightheaded.  · You have trouble breathing.  · You throw up (vomit) blood.  · You have trouble or pain when swallowing.  · You have bloody or black poop (stool).  · You have heartburn more than 3 times a week, for more than 2 weeks.  MAKE SURE YOU:  · Understand these instructions.  · Will watch your condition.  · Will get help right away if you are not doing well or get worse.     This  information is not intended to replace advice given to you by your health care provider. Make sure you discuss any questions you have with your health care provider.     Document Released: 08/29/2012 Document Revised: 03/11/2013 Document Reviewed: 04/13/2016  [employer] Interactive Patient Education ©2016 [employer]    Pericarditis  Pericarditis is swelling (inflammation) of the pericardium. The pericardium is a thin, double-layered, fluid-filled tissue sac that surrounds the heart. The purpose of the pericardium is to contain the heart in the chest cavity and keep the heart from overexpanding. Different types of pericarditis can occur, such as:  · Acute pericarditis. Inflammation can develop suddenly in acute pericarditis.  · Chronic pericarditis. Inflammation develops gradually and is long-lasting in chronic pericarditis.  · Constrictive pericarditis. In this type of pericarditis, the layers of the pericardium stiffen and develop scar tissue. The scar tissue thickens and sticks together. This makes it difficult for the heart to pump and work as it normally does.  CAUSES   Pericarditis can be caused from different conditions, such as:  · A bacterial, fungal or viral infection.  · After a heart attack (myocardial infarction).  · After open-heart surgery (coronary bypass graft surgery).  · Auto-immune conditions such as lupus, rheumatoid arthritis or scleroderma.  · Kidney failure.  · Low thyroid condition (hypothyroidism).  · Cancer from another part of the body that has spread (metastasized) to the pericardium.  · Chest injury or trauma.  · After radiation treatment.  · Certain medicines.  SYMPTOMS   Symptoms of pericarditis can include:  · Chest pain. Chest pain symptoms may increase when laying down and may be relieved when sitting up and leaning forward.  · A chronic, dry cough.  · Heart palpitations. These may feel like rapid, fluttering or pounding heart beats.  · Chest pain may be worse when  swallowing.  · Dizziness or fainting.  · Tiredness, fatigue or lethargy.  · Fever.  DIAGNOSIS   Pericarditis is diagnosed by the following:  · A physical exam. A heart sound called a pericardial friction rub may be heard when your caregiver listens to your heart.  · Blood work. Blood may be drawn to check for an infection and to look at your blood chemistry.  · Electrocardiography. During electrocardiography your heart's electrical activity is monitored and recorded with a tracing on paper (electrocardiogram [ECG]).  · Echocardiography.  · Computed tomography (CT).  · Magnetic resonance image (MRI).  TREATMENT   To treat pericarditis, it is important to know the cause of it. The cause of pericarditis determines the treatment.   · If the cause of pericarditis is due to an infection, treatment is based on the type of infection. If an infection is suspected in the pericardial fluid, a procedure called a pericardial fluid culture and biopsy may be done. This takes a sample of the pericardial fluid. The sample is sent to a lab which runs tests on the pericardial fluid to check for an infection.  · If the autoimmune disease is the cause, treatment of the autoimmune condition will help improve the pericarditis.  · If the cause of pericarditis is not known, anti-inflammatory medicines may be used to help decrease the inflammation.  · Surgery may be needed. The following are types of surgeries or procedures that may be done to treat pericarditis:  ¨ Pericardial window. A pericardial window makes a cut (incision) into the pericardial sac. This allows excess fluid in the pericardium to drain.  ¨ Pericardiocentesis. A pericardiocentesis is also known as a pericardial tap. This procedure uses a needle that is guided by X-ray to drain (aspirate) excess fluid from the pericardium.  ¨ Pericardiectomy. A pericardiectomy removes part or all of the pericardium.  HOME CARE INSTRUCTIONS   · Do not smoke. If you smoke, quit. Your  caregiver can help you quit smoking.  · Maintain a healthy weight.  · Follow an exercise program as directed by your health care provider. You may need to limit your exercising until your symptoms go away.  · If you drink alcohol, do so in moderation.  · Eat a heart healthy diet. A registered dietitian can help you learn about healthy food choices.  · Keep a list of all your medicines with you at all times. Include the name, dose, how often it is taken and how it is taken.  SEEK IMMEDIATE MEDICAL CARE IF:   · You have chest pain or feelings of chest pressure.  · You have sweating (diaphoresis) when at rest.  · You have irregular heartbeats (palpitations).  · You have rapid, racing heart beats.  · You have unexplained fainting episodes.  · You feel sick to your stomach (nausea) or vomiting without cause.  · You have unexplained weakness.  If you develop any of the symptoms which originally made you seek care, call for local emergency medical help. Do not drive yourself to the hospital.     This information is not intended to replace advice given to you by your health care provider. Make sure you discuss any questions you have with your health care provider.     Document Released: 06/13/2002 Document Revised: 05/03/2016 Document Reviewed: 12/19/2012  [email or web address] Interactive Patient Education ©2016 [email or web address]         Discharge Medication Instructions:    Below are the medications your physician expects you to take upon discharge:    Review all your home medications and newly ordered medications with your doctor and/or pharmacist. Follow medication instructions as directed by your doctor and/or pharmacist.    Please keep your medication list with you and share with your physician.               Medication List      START taking these medications        Instructions    colchicine 0.6 MG Tabs   Last time this was given:  0.6 mg on 1/12/2017  9:57 AM   Commonly known as:  COLCRYS    Take 1 Tab by mouth every day.   Dose:   0.6 mg       ibuprofen 600 MG Tabs   Last time this was given:  600 mg on 1/12/2017  1:39 PM   Commonly known as:  MOTRIN    Take 1 Tab by mouth 3 times a day.   Dose:  600 mg       nicotine 7 MG/24HR Pt24   Last time this was given:  7 mg on 1/11/2017  9:37 PM   Commonly known as:  NICODERM    Apply 1 Patch to skin as directed every 24 hours for 30 days.   Dose:  1 Patch       omeprazole 20 MG delayed-release capsule   Last time this was given:  20 mg on 1/12/2017  9:57 AM   Commonly known as:  PRILOSEC    Take 1 Cap by mouth every day.   Dose:  20 mg       sucralfate 1 GM Tabs   Commonly known as:  CARAFATE    Take 1 Tab by mouth 4 Times a Day,Before Meals and at Bedtime.   Dose:  1 g               Instructions           Diet / Nutrition:    Follow any diet instructions given to you by your doctor or the dietician, including how much salt (sodium) you are allowed each day.    If you are overweight, talk to your doctor about a weight reduction plan.    Activity:    Remain physically active following your doctor's instructions about exercise and activity.    Rest often.     Any time you become even a little tired or short of breath, SIT DOWN and rest.    Worsening Symptoms:    Report any of the following signs and symptoms to the doctor's office immediately:    *Pain of jaw, arm, or neck  *Chest pain not relieved by medication                               *Dizziness or loss of consciousness  *Difficulty breathing even when at rest   *More tired than usual                                       *Bleeding drainage or swelling of surgical site  *Swelling of feet, ankles, legs or stomach                 *Fever (>100ºF)  *Pink or blood tinged sputum  *Weight gain (3lbs/day or 5lbs /week)           *Shock from internal defibrillator (if applicable)  *Palpitations or irregular heartbeats                *Cool and/or numb extremities    Stroke Awareness    Common Risk Factors for Stroke include:    Age  Atrial  Fibrillation  Carotid Artery Stenosis  Diabetes Mellitus  Excessive alcohol consumption  High blood pressure  Overweight   Physical inactivity  Smoking    Warning signs and symptoms of a stroke include:    *Sudden numbness or weakness of the face, arm or leg (especially on one side of the body).  *Sudden confusion, trouble speaking or understanding.  *Sudden trouble seeing in one or both eyes.  *Sudden trouble walking, dizziness, loss of balance or coordination.Sudden severe headache with no known cause.    It is very important to get treatment quickly when a stroke occurs. If you experience any of the above warning signs, call 911 immediately.                   Disclaimer         Quit Smoking / Tobacco Use:    I understand the use of any tobacco products increases my chance of suffering from future heart disease or stroke and could cause other illnesses which may shorten my life. Quitting the use of tobacco products is the single most important thing I can do to improve my health. For further information on smoking / tobacco cessation call a Toll Free Quit Line at 1-430.754.5014 (*National Cancer [place]) or 1-770.279.9604 (American Lung Association) or you can access the web based program at www.lungusa.org.    [place] Tobacco Users Help Line:  (141) 575-8731       Toll Free: 1-335.701.7953  Quit Tobacco Program [facility] (679)704-3826    Crisis Hotline:    [place] Crisis Hotline:  4-183-CAYHPYE or 1-108.370.7308    [place] Crisis Hotline:    1-927.729.3735 or 451-924-6663    Discharge Survey:   Thank you for choosing [facility]. We hope we did everything we could to make your hospital stay a pleasant one. You may be receiving a phone survey and we would appreciate your time and participation in answering the questions. Your input is very valuable to us in our efforts to improve our service to our patients and their families.        My signature on this form indicates that:    1. I have  reviewed and understand the above information.  2. My questions regarding this information have been answered to my satisfaction.  3. I have formulated a plan with my discharge nurse to obtain my prescribed medications for home.                  Disclaimer         __________________________________                     __________       ________                       Patient Signature                                                 Date                    Time

## 2017-01-11 NOTE — ED NOTES
"Med rec updated and complete  Allergies reviewed  Pt states \"No prescription medications, OTC'S, or vitamins\".  Pt states \"No antibiotics in the last 30 days\".  Pt states \"I was not able to fill my prescriptions, I could not afford the prescription medications\".      "

## 2017-01-11 NOTE — SENIOR ADMIT NOTE
[facility] INTERNAL MEDICINE SENIOR ADMIT NOTE:  aDxa Tapia, PGY-3    Patient ID:   Name:             Heriberto Jenkins   YOB: 1960  Age:                 56 y.o.  male   MRN:               [id number]                                                          Chief Complaint:       Abdominal pain     History of Present Illness:    Mr. Jenkins is a 56 y.o. male with a PMhx of Alcohol abuse, untreated hypertension, and tobacco abuse presented to the ER with abdominal pain. He woke up with sharp epigastric pain in the middle of the night and had an episode of vomiting. He thought this was secondary to eating uncooked chicken before going to sleep. He was brought to the ER and on EKG was found to have concerning changes of possible STEMI with ST elevations. Cardiology was consulted and ruled out STEMI, thought possibly pericarditis.   Currently patient was doing better and almost pain free. Denied any SOB, nausea, vomiting, diaphoresis, diarrhea/constipation. Of note he does feel that his abdominal pain is better on leaning forward.      Of note he admits to heavy alcohol use- binge drinks 3 beers and 2 shots of vodka daily   Smokes 1 PPD    Active Ambulatory Problems     Diagnosis Date Noted   • No Active Ambulatory Problems     Resolved Ambulatory Problems     Diagnosis Date Noted   • No Resolved Ambulatory Problems     No Additional Past Medical History         Physical Exam:   Constitutional: Well developed, Well nourished, No acute distress  Cardiovascular: Normal heart rate, Normal rhythm, No murmurs heard  Lungs:  Tachypneic. Normal breath sounds, breath sounds clear to auscultation bilaterally, no rales, no rhonchi, no wheezing.   Abdomen: Bowel sounds normal, Soft, No tenderness, No guarding  Skin: Warm, Dry, No erythema, No rash     Neurologic: Alert & oriented x 3, Normal motor function, Normal sensory function, No focal deficits noted, cranial nerves II through XII are normal  Extremities:  no pedal edema, intact distal pulses        Assessment/Plan:   Epigastric pain- cardiac vs. GI   - Pericarditis vs. Gastritis vs. Other   - Lactic acid: 2.4-->2.1, Leucocytosis with left shift   - patient with trace pericardial effusion on CT, pending echo report. Trop negative. EKG with ST elevations more prominently in inferior and V5,V6 leads  - Patient presented with tachycardia and tachypnea however saturating in 90's on room air, wells score -2 so very low suspicion for PE right now   - CT abdo:  No acute abnormality. Trace pericardial effusion. Hepatomegaly and diffuse hepatic steatosis. Diverticulosis  - Patient given 325mg aspirin in the ER  - Cardiology consulted; Echo done and normal; no pericardial effusion however does not rule out pericarditis especially given his EKG findings  - Repeat EKG; continue daily aspirin   - Obtain inflammatory markers ESR and CRP   - Start omeprazole 20mg for possible gastritis and GI cocktail  - Cautious to give alcoholic NSAID's however given that he continues to have pain which is quite typical and EKG findings of likely pericarditis will start on ibuprofen and colchicine for now.      Please refer to [facility] for complete admission details.

## 2017-01-12 VITALS
SYSTOLIC BLOOD PRESSURE: 106 MMHG | BODY MASS INDEX: 20.34 KG/M2 | TEMPERATURE: 98.8 F | WEIGHT: 153.44 LBS | RESPIRATION RATE: 16 BRPM | HEART RATE: 87 BPM | HEIGHT: 73 IN | DIASTOLIC BLOOD PRESSURE: 79 MMHG | OXYGEN SATURATION: 96 %

## 2017-01-12 PROBLEM — I31.9 PERICARDITIS: Status: ACTIVE | Noted: 2017-01-12

## 2017-01-12 LAB
ALBUMIN SERPL BCP-MCNC: 3 G/DL (ref 3.2–4.9)
ALBUMIN/GLOB SERPL: 1.1 G/DL
ALP SERPL-CCNC: 61 U/L (ref 30–99)
ALT SERPL-CCNC: 15 U/L (ref 2–50)
ANION GAP SERPL CALC-SCNC: 4 MMOL/L (ref 0–11.9)
AST SERPL-CCNC: 15 U/L (ref 12–45)
BASOPHILS # BLD AUTO: 1.1 % (ref 0–1.8)
BASOPHILS # BLD: 0.08 K/UL (ref 0–0.12)
BILIRUB SERPL-MCNC: 0.5 MG/DL (ref 0.1–1.5)
BUN SERPL-MCNC: 10 MG/DL (ref 8–22)
CALCIUM SERPL-MCNC: 8.6 MG/DL (ref 8.5–10.5)
CHLORIDE SERPL-SCNC: 107 MMOL/L (ref 96–112)
CO2 SERPL-SCNC: 27 MMOL/L (ref 20–33)
CREAT SERPL-MCNC: 0.75 MG/DL (ref 0.5–1.4)
EKG IMPRESSION: NORMAL
EOSINOPHIL # BLD AUTO: 0.07 K/UL (ref 0–0.51)
EOSINOPHIL NFR BLD: 0.9 % (ref 0–6.9)
ERYTHROCYTE [DISTWIDTH] IN BLOOD BY AUTOMATED COUNT: 47.7 FL (ref 35.9–50)
GFR SERPL CREATININE-BSD FRML MDRD: >60 ML/MIN/1.73 M 2
GLOBULIN SER CALC-MCNC: 2.8 G/DL (ref 1.9–3.5)
GLUCOSE SERPL-MCNC: 96 MG/DL (ref 65–99)
HCT VFR BLD AUTO: 35.7 % (ref 42–52)
HGB BLD-MCNC: 12.5 G/DL (ref 14–18)
IMM GRANULOCYTES # BLD AUTO: 0.03 K/UL (ref 0–0.11)
IMM GRANULOCYTES NFR BLD AUTO: 0.4 % (ref 0–0.9)
LYMPHOCYTES # BLD AUTO: 1.57 K/UL (ref 1–4.8)
LYMPHOCYTES NFR BLD: 20.7 % (ref 22–41)
MCH RBC QN AUTO: 34.3 PG (ref 27–33)
MCHC RBC AUTO-ENTMCNC: 35 G/DL (ref 33.7–35.3)
MCV RBC AUTO: 98.1 FL (ref 81.4–97.8)
MONOCYTES # BLD AUTO: 0.74 K/UL (ref 0–0.85)
MONOCYTES NFR BLD AUTO: 9.7 % (ref 0–13.4)
NEUTROPHILS # BLD AUTO: 5.11 K/UL (ref 1.82–7.42)
NEUTROPHILS NFR BLD: 67.2 % (ref 44–72)
NRBC # BLD AUTO: 0 K/UL
NRBC BLD AUTO-RTO: 0 /100 WBC
PLATELET # BLD AUTO: 151 K/UL (ref 164–446)
PMV BLD AUTO: 10.2 FL (ref 9–12.9)
POTASSIUM SERPL-SCNC: 3.9 MMOL/L (ref 3.6–5.5)
PROT SERPL-MCNC: 5.8 G/DL (ref 6–8.2)
RBC # BLD AUTO: 3.64 M/UL (ref 4.7–6.1)
SODIUM SERPL-SCNC: 138 MMOL/L (ref 135–145)
WBC # BLD AUTO: 7.6 K/UL (ref 4.8–10.8)

## 2017-01-12 PROCEDURE — 93010 ELECTROCARDIOGRAM REPORT: CPT | Performed by: INTERNAL MEDICINE

## 2017-01-12 PROCEDURE — 36415 COLL VENOUS BLD VENIPUNCTURE: CPT

## 2017-01-12 PROCEDURE — 85025 COMPLETE CBC W/AUTO DIFF WBC: CPT

## 2017-01-12 PROCEDURE — 700105 HCHG RX REV CODE 258: Performed by: INTERNAL MEDICINE

## 2017-01-12 PROCEDURE — 99239 HOSP IP/OBS DSCHRG MGMT >30: CPT | Mod: GC | Performed by: INTERNAL MEDICINE

## 2017-01-12 PROCEDURE — 99232 SBSQ HOSP IP/OBS MODERATE 35: CPT | Mod: 25 | Performed by: INTERNAL MEDICINE

## 2017-01-12 PROCEDURE — 80053 COMPREHEN METABOLIC PANEL: CPT

## 2017-01-12 PROCEDURE — 700102 HCHG RX REV CODE 250 W/ 637 OVERRIDE(OP): Performed by: INTERNAL MEDICINE

## 2017-01-12 PROCEDURE — A9270 NON-COVERED ITEM OR SERVICE: HCPCS | Performed by: INTERNAL MEDICINE

## 2017-01-12 PROCEDURE — 93005 ELECTROCARDIOGRAM TRACING: CPT | Performed by: INTERNAL MEDICINE

## 2017-01-12 RX ORDER — COLCHICINE 0.6 MG/1
0.6 TABLET ORAL DAILY
Qty: 90 TAB | Refills: 0 | Status: SHIPPED | OUTPATIENT
Start: 2017-01-12 | End: 2020-07-28

## 2017-01-12 RX ORDER — OMEPRAZOLE 20 MG/1
20 CAPSULE, DELAYED RELEASE ORAL DAILY
Qty: 30 CAP | Refills: 3 | Status: SHIPPED | OUTPATIENT
Start: 2017-01-12 | End: 2020-07-28

## 2017-01-12 RX ORDER — IBUPROFEN 600 MG/1
600 TABLET ORAL 3 TIMES DAILY
Qty: 21 TAB | Refills: 0 | Status: SHIPPED | OUTPATIENT
Start: 2017-01-12 | End: 2020-07-28

## 2017-01-12 RX ORDER — SUCRALFATE 1 G/1
1 TABLET ORAL
Qty: 120 TAB | Refills: 1 | Status: SHIPPED | OUTPATIENT
Start: 2017-01-12 | End: 2020-07-28

## 2017-01-12 RX ADMIN — COLCHICINE 0.6 MG: 0.6 TABLET, FILM COATED ORAL at 09:57

## 2017-01-12 RX ADMIN — OMEPRAZOLE 20 MG: 20 CAPSULE, DELAYED RELEASE ORAL at 09:57

## 2017-01-12 RX ADMIN — IBUPROFEN 600 MG: 600 TABLET, FILM COATED ORAL at 13:39

## 2017-01-12 RX ADMIN — IBUPROFEN 600 MG: 600 TABLET, FILM COATED ORAL at 03:22

## 2017-01-12 RX ADMIN — SODIUM CHLORIDE: 9 INJECTION, SOLUTION INTRAVENOUS at 05:24

## 2017-01-12 ASSESSMENT — ENCOUNTER SYMPTOMS
PND: 0
WEIGHT LOSS: 0
DEPRESSION: 0
FALLS: 0
COUGH: 0
SPEECH CHANGE: 0
ORTHOPNEA: 0
SHORTNESS OF BREATH: 0
EYE DISCHARGE: 0
BLOOD IN STOOL: 0
DOUBLE VISION: 0
DIZZINESS: 0
HALLUCINATIONS: 0
CLAUDICATION: 0
BLURRED VISION: 0
CHILLS: 0
BRUISES/BLEEDS EASILY: 0
PALPITATIONS: 0
FEVER: 0
MYALGIAS: 0
SENSORY CHANGE: 0
VOMITING: 0
EYE PAIN: 0
LOSS OF CONSCIOUSNESS: 0
NAUSEA: 0
HEADACHES: 0
ABDOMINAL PAIN: 0

## 2017-01-12 ASSESSMENT — PATIENT HEALTH QUESTIONNAIRE - PHQ9
SUM OF ALL RESPONSES TO PHQ QUESTIONS 1-9: 0
1. LITTLE INTEREST OR PLEASURE IN DOING THINGS: NOT AT ALL
SUM OF ALL RESPONSES TO PHQ9 QUESTIONS 1 AND 2: 0
2. FEELING DOWN, DEPRESSED, IRRITABLE, OR HOPELESS: NOT AT ALL

## 2017-01-12 ASSESSMENT — PAIN SCALES - GENERAL
PAINLEVEL_OUTOF10: 0

## 2017-01-12 NOTE — DISCHARGE PLANNING
Per RN, pt is in need of bus pass and socks for safe dc. SW provided both to RN. Bus Pass # [id number].

## 2017-01-12 NOTE — PROGRESS NOTES
Dr. Lawrence returned page, reviewed recent EKG does not think pt is having a STEMI, agrees with the repeat troponin.

## 2017-01-12 NOTE — PROGRESS NOTES
ST appears to be more elevated on tele monitor than last EKG, rapid response called, [facility] purple paged. Pt has 8/10 abdominal pain.

## 2017-01-12 NOTE — DISCHARGE SUMMARY
[facility] Discharge Summary      Admit Date:  1/11/2017       Discharge Date:  1/12/2017    Service:   [facility] Internal Medicine Purple Team  Attending Physician(s):   Dr. Cueto       Senior Resident(s):   Dr. Tapia  Ciro Resident(s):   Dr. Tovar      Primary Diagnosis:   Pericarditis [I31.9]   Epigastric abdominal pain [R10.13]       Secondary Diagnoses:                Active Hospital Problems    Diagnosis   • Pericarditis [I31.9]   • Epigastric abdominal pain [R10.13]   • Chest pain [R07.9]   • Alcohol abuse [F10.10]       Hospital Summary (Brief Narrative):         55 yo male with past medical history of alcohol abuse was admitted for epigastric pain and vomiting x 12 hours due to eating undercooked chicken. Vitals were unremarkable with the exception of tachycardia, and tachypnea. Leukocytosis with a left shift and lactic acid of 2.4 was noted. CT was performed and showed trace pericardial effusion and hepatomegaly and diffuse haptic steatosis. While in the ED, patient complained of chest pain. Troponin was performed and found to be negative. EKG showed ST elevations more prominently in inferior and V5, V6 leads, Dr. Fowler evaluated patient in ED and felt that these findings were more consistent with pericarditis. Patient was treated with one dose 325mg ASA, and given colchicine, and ibuprofen for possible pericarditis. Echo was performed without any significant findings. Patient was admitted to the floor. His overall hospital course was complicated by one rapid response called due to suspicious EKG findings. Repeat EGK and troponin were performed with same results in ED. It was again decided that this was most likely pericarditis. Patient was monitored overnight and treated with omeprazole, GI cocktail for possible gastritis. By morning his abdominal pain had completely subsided. Lactate trended downwards. Since ACS was ruled out and epigastric pain was likely due to a combination of  alcohol abuse and undercooked chicken, it was decided to discharge patient. He will be discharged with one week of ibuprofen, and 3 months of colchicine (QD since patient is less than 70 kilo's) to help decrease the chance of relapse of his pericarditis. Patient was also prescribed omeprazole and sulcralfate for relief of his epigastric pain, and prevention of NSAID induced gastritis. He was also set up at [facility] clinic for hospital follow up, and for treatment of alcohol abuse. Patient was educated on the dangers of alcohol abuse prior to discharge,and will be provided with [facility] bus pass and [facility]     Patient /Hospital Summary (Details -- Problem Oriented) :          Epigastric abdominal pain  Assessment & Plan  - Likely Pericarditis vs. Gastritis   - Patient presented with tachycardia and tachypnea however saturating in 90's on room air  - Lactic acid: 2.4-->2.1->1.7, Leucocytosis (15.4) with left shift    - CT abdomen showed trace pericardial effusion and hepatomegaly and diffuse hepatic steatosis  - Echo was negative for cardiac disease  - 3 sets of trop negative.   -  EKG with ST elevations more prominently in inferior and V5, V6 leads-> Dr. Fowler evaluated patient in ED and felt that these findings were more consistent with pericarditis  - Patient given 325mg aspirin in the ER  - Repeat EKG; continue daily aspirin    - ESR and CRP    - Patient now on omeprazole 20mg and GI cocktail  - Cautious to give alcoholic NSAID's due to possibility of exacerbating gastritis, but  given that he continues to have pain which is quite typical and EKG findings of likely pericarditis we decided to start patient on ibuprofen and colchicine       Chest pain  Assessment & Plan  - Likely Pericarditis vs. Gastritis   - Patient presented with tachycardia and tachypnea however saturating in 90's on room air  - Lactic acid: 2.4-->2.1->1.7, Leucocytosis (15.4) with left shift    - CT abdomen showed trace  pericardial effusion and hepatomegaly and diffuse hepatic steatosis  - Echo was negative for cardiac disease  - 3 sets of trop negative.   -  EKG with ST elevations more prominently in inferior and V5, V6 leads-> Dr. Fowler evaluated patient in ED and felt that these findings were more consistent with pericarditis  - Patient given 325mg aspirin in the ER  - Repeat EKG; continue daily aspirin    - ESR and CRP    - Patient now on omeprazole 20mg and GI cocktail  - Cautious to give alcoholic NSAID's due to possibility of exacerbating gastritis, but  given that he continues to have pain which is quite typical and EKG findings of likely pericarditis we decided to start patient on ibuprofen and colchicine       Alcohol abuse  Assessment & Plan  Heavy drinker with about 3 (16 oz beers) and a shot of vodka daily  Will place patient on CIWA with [facility] protocol if patient shows signs of withdrawal      Consultants:     Cardiology    Procedures:        None    Imaging/ Testing:      ECHOCARDIOGRAM COMP W/O CONT   Final Result      US-GALLBLADDER   Final Result         1.  Mild hepatomegaly with echogenic liver suggests fatty change versus fibrosis.   2.  Mild atherosclerosis.   3.  Borderline gallbladder wall thickening, otherwise unremarkable gallbladder         CT-ABDOMEN-PELVIS WITH   Final Result         1.  No acute abnormality.   2.  Trace pericardial effusion   3.  Hepatomegaly and diffuse hepatic steatosis.   4.  Atherosclerosis and atherosclerotic coronary artery disease   5.  Diverticulosis      DX-ABDOMEN COMPLETE WITH AP OR PA CXR   Final Result         1.  No acute cardiopulmonary disease is evident.   2.  Hyperexpansion of lungs compatible with changes of COPD.   3.  Nonspecific bowel gas pattern.            Discharge Medications:         Medication Reconciliation: Completed     Medication List      START taking these medications       Instructions    colchicine 0.6 MG Tabs   Last time this was given:   0.6 mg on 1/12/2017  9:57 AM   Commonly known as:  COLCRYS    Take 1 Tab by mouth every day.   Dose:  0.6 mg       ibuprofen 600 MG Tabs   Last time this was given:  600 mg on 1/12/2017  1:39 PM   Commonly known as:  MOTRIN    Take 1 Tab by mouth 3 times a day.   Dose:  600 mg       nicotine 7 MG/24HR Pt24   Last time this was given:  7 mg on 1/11/2017  9:37 PM   Commonly known as:  NICODERM    Apply 1 Patch to skin as directed every 24 hours for 30 days.   Dose:  1 Patch       omeprazole 20 MG delayed-release capsule   Last time this was given:  20 mg on 1/12/2017  9:57 AM   Commonly known as:  PRILOSEC    Take 1 Cap by mouth every day.   Dose:  20 mg       sucralfate 1 GM Tabs   Commonly known as:  CARAFATE    Take 1 Tab by mouth 4 Times a Day,Before Meals and at Bedtime.   Dose:  1 g             Disposition:   Ready for discharge, no new comlpaints    Diet:   Regular    Activity:   As tolerated    Instructions:         The patient was instructed to return to the ER in the event of worsening symptoms. I have counseled the patient on the importance of compliance and the patient has agreed to proceed with all medical recommendations and follow up plan indicated above.   The patient understands that all medications come with benefits and risks. Risks may include permanent injury or death and these risks can be minimized with close reassessment and monitoring.        Primary Care Provider:    Patient will be set up at [facility] clinic  Copy of discharge summary given to the patient: Completed    Follow up appointment details :      Patient will be set up at [facility] clinic for follow up within the next two weeks    Pending Studies:        None    Time spent on discharge day patient visit, preparing discharge paperwork and arranging for patient follow up.    Discharge Time (Minutes) :   45 minutes      Condition on Discharge  stable  ______________________________________________________________________    Interval history/exam  for day of discharge:    Patient was seen and examined today, epigastric/chest pain had resolved, he reported that he felt great and wanted to go home    Filed Vitals:    01/12/17 0033 01/12/17 0527 01/12/17 0729 01/12/17 1204   BP: 110/81 112/84 126/82 106/79   Pulse: 101 84 91 87   Temp: 37 °C (98.6 °F) 36.4 °C (97.6 °F) 36.6 °C (97.8 °F) 37.1 °C (98.8 °F)   Resp: 18 18 16 16   Height:       Weight:       SpO2: 92% 94% 94% 96%     Weight/BMI: Body mass index is 20.25 kg/(m^2).  Pulse Oximetry: 96 %, O2 (LPM): 0, O2 Delivery: None (Room Air)    General: NAD  CVS: RRR, S1 and S2 presents, no friction rubs heard  PULM: CTA b/l, no wheezing  ABD: Non-tender to palpation, no rebound/guarding/rigidity     Most Recent Labs:    Lab Results   Component Value Date/Time    WBC 7.6 01/12/2017 02:03 AM    RBC 3.64* 01/12/2017 02:03 AM    HEMOGLOBIN 12.5* 01/12/2017 02:03 AM    HEMATOCRIT 35.7* 01/12/2017 02:03 AM    MCV 98.1* 01/12/2017 02:03 AM    MCH 34.3* 01/12/2017 02:03 AM    MCHC 35.0 01/12/2017 02:03 AM    MPV 10.2 01/12/2017 02:03 AM    NEUTROPHILS-POLYS 67.20 01/12/2017 02:03 AM    LYMPHOCYTES 20.70* 01/12/2017 02:03 AM    MONOCYTES 9.70 01/12/2017 02:03 AM    EOSINOPHILS 0.90 01/12/2017 02:03 AM    BASOPHILS 1.10 01/12/2017 02:03 AM      Lab Results   Component Value Date/Time    SODIUM 138 01/12/2017 02:03 AM    POTASSIUM 3.9 01/12/2017 02:03 AM    CHLORIDE 107 01/12/2017 02:03 AM    CO2 27 01/12/2017 02:03 AM    GLUCOSE 96 01/12/2017 02:03 AM    BUN 10 01/12/2017 02:03 AM    CREATININE 0.75 01/12/2017 02:03 AM      Lab Results   Component Value Date/Time    ALT(SGPT) 15 01/12/2017 02:03 AM    AST(SGOT) 15 01/12/2017 02:03 AM    ALKALINE PHOSPHATASE 61 01/12/2017 02:03 AM    TOTAL BILIRUBIN 0.5 01/12/2017 02:03 AM    LIPASE 25 01/11/2017 12:51 AM    ALBUMIN 3.0* 01/12/2017 02:03 AM    GLOBULIN 2.8 01/12/2017 02:03 AM    MACROCYTOSIS 1+ 03/25/2012 09:16 AM     No results found for: PROTHROMBTM, INR

## 2017-01-12 NOTE — DISCHARGE INSTRUCTIONS
Discharge Instructions    Discharged to home by bus with provided bus pass with self. Discharged via walking, hospital escort: Refused.  Special equipment needed: Not Applicable    Be sure to schedule a follow-up appointment with your primary care doctor or any specialists as instructed.     Discharge Plan:   Diet Plan: Discussed  Activity Level: Discussed  Smoking Cessation Offered: Patient Counseled  Confirmed Follow up Appointment: Patient to Call and Schedule Appointment  Confirmed Symptoms Management: Discussed  Medication Reconciliation Updated: Yes  Influenza Vaccine Indication: Patient Refuses    I understand that a diet low in cholesterol, fat, and sodium is recommended for good health. Unless I have been given specific instructions below for another diet, I accept this instruction as my diet prescription.   Other diet: Cardiac Heart Healthy    Special Instructions: None    · Is patient discharged on Warfarin / Coumadin?   No     · Is patient Post Blood Transfusion?  No    Depression / Suicide Risk    As you are discharged from this [facility] facility, it is important to learn how to keep safe from harming yourself.    Recognize the warning signs:  · Abrupt changes in personality, positive or negative- including increase in energy   · Giving away possessions  · Change in eating patterns- significant weight changes-  positive or negative  · Change in sleeping patterns- unable to sleep or sleeping all the time   · Unwillingness or inability to communicate  · Depression  · Unusual sadness, discouragement and loneliness  · Talk of wanting to die  · Neglect of personal appearance   · Rebelliousness- reckless behavior  · Withdrawal from people/activities they love  · Confusion- inability to concentrate     If you or a loved one observes any of these behaviors or has concerns about self-harm, here's what you can do:  · Talk about it- your feelings and reasons for harming yourself  · Remove any means that you  might use to hurt yourself (examples: pills, rope, extension cords, firearm)  · Get professional help from the community (Mental Health, Substance Abuse, psychological counseling)  · Do not be alone:Call your Safe Contact- someone whom you trust who will be there for you.  · Call your local CRISIS HOTLINE 331-9580 or 109-160-1947  · Call your local Children's Mobile Crisis Response Team [place] (217) 300-8849 or wwwFloobits  · Call the toll free National Suicide Prevention Hotlines   · National Suicide Prevention Lifeline 358-308-YPFY (3342)  · National Hope Line Network 800-SUICIDE (912-5580)    Chest Pain, Nonspecific  It is often hard to give a specific diagnosis for the cause of chest pain. There is always a chance that your pain could be related to something serious, like a heart attack or a blood clot in the lungs. You need to follow up with your caregiver for further evaluation. More lab tests or other studies such as X-rays, electrocardiography, stress testing, or cardiac imaging may be needed to find the cause of your pain.  Most of the time, nonspecific chest pain improves within 2 to 3 days with rest and mild pain medicine. For the next few days, avoid physical exertion or activities that bring on pain. Do not smoke. Avoid drinking alcohol. Call your caregiver for routine follow-up as advised.   SEEK IMMEDIATE MEDICAL CARE IF:  · You develop increased chest pain or pain that radiates to the arm, neck, jaw, back, or abdomen.   · You develop shortness of breath, increased coughing, or you start coughing up blood.   · You have severe back or abdominal pain, nausea, or vomiting.   · You develop severe weakness, fainting, fever, or chills.   Document Released: 12/18/2006 Document Revised: 03/11/2013 Document Reviewed: 06/06/2008  ExitCare® Patient Information ©2013 [employer].    Heartburn  Heartburn is a painful, burning feeling in the chest. It may feel worse when you lie down or bend over.  Heartburn is caused by stomach acid moving into the tube that carries food from the mouth to the stomach (esophagus).  HOME CARE  · Take all medicine as told by your doctor.  · Raise the head of your bed with blocks only as told by your doctor.  · Do not exercise right after eating.  · Avoid eating 2 or 3 hours before bed. Do not lie down right after eating.  · Eat small meals throughout the day instead of 3 large meals.  · Stop smoking if you smoke.  · Keep up a healthy weight.  · Avoid foods that give you heartburn. Foods you may want to avoid include:  · Peppers.  · Chocolate.  · High-fat foods, including fried foods.  · Spicy foods.  · Garlic and onions.  · Citrus fruits, including oranges, grapefruit, penny, and limes.  · Food containing tomatoes or tomato products.  · Mint.  · Bubbly (carbonated) drinks and drinks with caffeine.  · Vinegar.  GET HELP RIGHT AWAY IF:  · You have bad chest pain that goes down your arm or into your jaw or neck.  · You feel sweaty, dizzy, or lightheaded.  · You have trouble breathing.  · You throw up (vomit) blood.  · You have trouble or pain when swallowing.  · You have bloody or black poop (stool).  · You have heartburn more than 3 times a week, for more than 2 weeks.  MAKE SURE YOU:  · Understand these instructions.  · Will watch your condition.  · Will get help right away if you are not doing well or get worse.     This information is not intended to replace advice given to you by your health care provider. Make sure you discuss any questions you have with your health care provider.     Document Released: 08/29/2012 Document Revised: 03/11/2013 Document Reviewed: 04/13/2016  [employer] Interactive Patient Education ©2016 [employer]    Pericarditis  Pericarditis is swelling (inflammation) of the pericardium. The pericardium is a thin, double-layered, fluid-filled tissue sac that surrounds the heart. The purpose of the pericardium is to contain the heart in the chest cavity and  keep the heart from overexpanding. Different types of pericarditis can occur, such as:  · Acute pericarditis. Inflammation can develop suddenly in acute pericarditis.  · Chronic pericarditis. Inflammation develops gradually and is long-lasting in chronic pericarditis.  · Constrictive pericarditis. In this type of pericarditis, the layers of the pericardium stiffen and develop scar tissue. The scar tissue thickens and sticks together. This makes it difficult for the heart to pump and work as it normally does.  CAUSES   Pericarditis can be caused from different conditions, such as:  · A bacterial, fungal or viral infection.  · After a heart attack (myocardial infarction).  · After open-heart surgery (coronary bypass graft surgery).  · Auto-immune conditions such as lupus, rheumatoid arthritis or scleroderma.  · Kidney failure.  · Low thyroid condition (hypothyroidism).  · Cancer from another part of the body that has spread (metastasized) to the pericardium.  · Chest injury or trauma.  · After radiation treatment.  · Certain medicines.  SYMPTOMS   Symptoms of pericarditis can include:  · Chest pain. Chest pain symptoms may increase when laying down and may be relieved when sitting up and leaning forward.  · A chronic, dry cough.  · Heart palpitations. These may feel like rapid, fluttering or pounding heart beats.  · Chest pain may be worse when swallowing.  · Dizziness or fainting.  · Tiredness, fatigue or lethargy.  · Fever.  DIAGNOSIS   Pericarditis is diagnosed by the following:  · A physical exam. A heart sound called a pericardial friction rub may be heard when your caregiver listens to your heart.  · Blood work. Blood may be drawn to check for an infection and to look at your blood chemistry.  · Electrocardiography. During electrocardiography your heart's electrical activity is monitored and recorded with a tracing on paper (electrocardiogram [ECG]).  · Echocardiography.  · Computed tomography (CT).  · Magnetic  resonance image (MRI).  TREATMENT   To treat pericarditis, it is important to know the cause of it. The cause of pericarditis determines the treatment.   · If the cause of pericarditis is due to an infection, treatment is based on the type of infection. If an infection is suspected in the pericardial fluid, a procedure called a pericardial fluid culture and biopsy may be done. This takes a sample of the pericardial fluid. The sample is sent to a lab which runs tests on the pericardial fluid to check for an infection.  · If the autoimmune disease is the cause, treatment of the autoimmune condition will help improve the pericarditis.  · If the cause of pericarditis is not known, anti-inflammatory medicines may be used to help decrease the inflammation.  · Surgery may be needed. The following are types of surgeries or procedures that may be done to treat pericarditis:  ¨ Pericardial window. A pericardial window makes a cut (incision) into the pericardial sac. This allows excess fluid in the pericardium to drain.  ¨ Pericardiocentesis. A pericardiocentesis is also known as a pericardial tap. This procedure uses a needle that is guided by X-ray to drain (aspirate) excess fluid from the pericardium.  ¨ Pericardiectomy. A pericardiectomy removes part or all of the pericardium.  HOME CARE INSTRUCTIONS   · Do not smoke. If you smoke, quit. Your caregiver can help you quit smoking.  · Maintain a healthy weight.  · Follow an exercise program as directed by your health care provider. You may need to limit your exercising until your symptoms go away.  · If you drink alcohol, do so in moderation.  · Eat a heart healthy diet. A registered dietitian can help you learn about healthy food choices.  · Keep a list of all your medicines with you at all times. Include the name, dose, how often it is taken and how it is taken.  SEEK IMMEDIATE MEDICAL CARE IF:   · You have chest pain or feelings of chest pressure.  · You have sweating  (diaphoresis) when at rest.  · You have irregular heartbeats (palpitations).  · You have rapid, racing heart beats.  · You have unexplained fainting episodes.  · You feel sick to your stomach (nausea) or vomiting without cause.  · You have unexplained weakness.  If you develop any of the symptoms which originally made you seek care, call for local emergency medical help. Do not drive yourself to the hospital.     This information is not intended to replace advice given to you by your health care provider. Make sure you discuss any questions you have with your health care provider.     Document Released: 06/13/2002 Document Revised: 05/03/2016 Document Reviewed: 12/19/2012  [employer] ? Interactive Patient Education ©2016 [employer]

## 2017-01-12 NOTE — ASSESSMENT & PLAN NOTE
- Likely Pericarditis vs. Gastritis   - Patient presented with tachycardia and tachypnea however saturating in 90's on room air  - Lactic acid: 2.4-->2.1->1.7, Leucocytosis (15.4) with left shift    - CT abdomen showed trace pericardial effusion and hepatomegaly and diffuse hepatic steatosis  - Echo was negative for cardiac disease  - 3 sets of trop negative.   -  EKG with ST elevations more prominently in inferior and V5, V6 leads-> Dr. Fowler evaluated patient in ED and felt that these findings were more consistent with pericarditis  - Patient given 325mg aspirin in the ER  - Repeat EKG; continue daily aspirin    - ESR and CRP    - Patient now on omeprazole 20mg and GI cocktail  - Cautious to give alcoholic NSAID's due to possibility of exacerbating gastritis, but  given that he continues to have pain which is quite typical and EKG findings of likely pericarditis we decided to start patient on ibuprofen and colchicine

## 2017-01-12 NOTE — H&P
"       [facility] Admitting History and Physical    Name Heriberto Jenkins     1960   Age/Sex 56 y.o. male   MRN [id number]   Code Status Full     After 5PM or if no immediate response to page, please call for cross-coverage  Attending/Team: Nory/Carol Call [phone] to page   1st Call - Day Intern (R1):   Guy 2nd Call - Day Sr. Resident (R2/R3):   Regina       Chief Complaint:  Epigastric pain with vomiting, chest pain    HPI:  57 yo male with past medical history of alcohol abuse, and hypertension presented to the ED with epigastric and chest pain. Patient reported that his abdominal pain started early this morning while he was sleeping. He described the epigastric pain as gradual, 10/10, worse with breathing, and similar to a \"toothache\". He also admitted to multiple episodes of non-bloody vomiting which had dissipated by the time he got to the ED. He denied any fevers, chills, headaches, or diarrhea. Patient reports that he ate uncooked chicken last night, but was unsure if he was related since his brother is not sick and ate the same chicken. He also admitted to drinking his daily amount of vodka and beer as well. When patient got to the ED, he started complaining of new onset chest pain. He described the pain as \"dull\" and a \"stuck\" feeling. He denied any chest pain, radiation of pain, shortness of breath, palpitations, or dizziness. He also denied any history of exertional angina. Patient reports that he just got over an upper respiratory illness which lasted about a week.    Cardiology (Dr. Justice) was consulted in the ED, after EKG was concerning for ACS-> He felt His ECG was more reminiscent of pericarditis since and he has had 10-12 hours of symptoms without an elevation of his troponins        Review of Systems   Constitutional: Negative for fever and chills.   HENT: Negative for hearing loss.    Eyes: Negative for blurred vision, double vision and photophobia. " "  Respiratory: Positive for cough. Negative for hemoptysis, sputum production, shortness of breath and wheezing.    Cardiovascular: Positive for chest pain. Negative for palpitations, orthopnea, claudication, leg swelling and PND.   Gastrointestinal: Positive for nausea, vomiting and abdominal pain. Negative for heartburn, diarrhea and constipation.   Genitourinary: Negative for dysuria, urgency and frequency.   Musculoskeletal: Negative for myalgias, back pain and neck pain.   Skin: Negative for rash.   Neurological: Negative for dizziness, tingling, tremors and headaches.   Psychiatric/Behavioral: Negative for depression, suicidal ideas and substance abuse.             Past Medical History:   History reviewed. No pertinent past medical history.    Past Surgical History:  Past Surgical History   Procedure Laterality Date   • Hernia repair       inguinal hernia repair at age 4 and age 7    • Other     • Submandible abscess incision and drainage  3/25/2012     Performed by JORGE LUIS OROSCO at SURGERY [facility]   • Dental extraction(s)  3/25/2012     Performed by JORGE LUIS OROSCO at SURGERY [facility]       Current Outpatient Medications:  Home Medications     Reviewed by Yimi Marino (Pharmacy Tech) on 01/11/17 at 0833  Med List Status: Complete    Medication Last Dose Status          Patient Nilson Taking any Medications                        Medication Allergy/Sensitivities:  No Known Allergies      Family History:  History reviewed. No pertinent family history.    Social History:  Social History     Social History   • Marital Status: Single     Spouse Name: N/A   • Number of Children: N/A   • Years of Education: N/A     Occupational History   • Not on file.     Social History Main Topics   • Smoking status: Current Every Day Smoker -- 1.00 packs/day     Types: Cigarettes   • Smokeless tobacco: Not on file   • Alcohol Use: Yes      Comment: daily beer approx 6 to 7 and vodka (\"(If I have no beer\") " "approx 1/4 bottle   • Drug Use: Not on file   • Sexual Activity: Not on file     Other Topics Concern   • Not on file     Social History Narrative     Living situation: lives with brother, homeless in the past  PCP : Pcp Pt States None        Physical Exam     Filed Vitals:    01/11/17 1728 01/11/17 1802 01/11/17 1808 01/11/17 1827   BP: 145/103 155/109 140/99    Pulse: 96 92 97 90   Temp: 36.9 °C (98.4 °F) 36.3 °C (97.3 °F) 36.8 °C (98.2 °F)    Resp: 16 20 20 18   Height:       Weight:       SpO2: 97% 97% 95% 96%     Body mass index is 19.17 kg/(m^2).  /99 mmHg  Pulse 90  Temp(Src) 36.8 °C (98.2 °F)  Resp 18  Ht 1.854 m (6' 1\")  Wt 65.9 kg (145 lb 4.5 oz)  BMI 19.17 kg/m2  SpO2 96%  O2 therapy: Pulse Oximetry: 96 %, O2 (LPM): 2.5, O2 Delivery: None (Room Air)    Physical Exam   Constitutional: He is well-developed, well-nourished, and in no distress. No distress.   HENT:   Head: Normocephalic and atraumatic.   Neck: Normal range of motion. Neck supple.   Cardiovascular: Regular rhythm, normal heart sounds and intact distal pulses.  Exam reveals no gallop and no friction rub.    No murmur heard.  tachycardia   Pulmonary/Chest: Effort normal and breath sounds normal. No respiratory distress. He has no wheezes.   Abdominal: Soft. He exhibits no distension. There is tenderness (epigastric area). There is no rebound and no guarding.   Skin: He is not diaphoretic.             Data Review       Old Records Request:   Completed  Current Records review and summary: Completed    Lab Data Review:  Recent Results (from the past 24 hour(s))   CBC WITH DIFFERENTIAL    Collection Time: 01/11/17 12:51 AM   Result Value Ref Range    WBC 15.4 (H) 4.8 - 10.8 K/uL    RBC 4.69 (L) 4.70 - 6.10 M/uL    Hemoglobin 16.1 14.0 - 18.0 g/dL    Hematocrit 46.0 42.0 - 52.0 %    MCV 98.1 (H) 81.4 - 97.8 fL    MCH 34.3 (H) 27.0 - 33.0 pg    MCHC 35.0 33.7 - 35.3 g/dL    RDW 46.7 35.9 - 50.0 fL    Platelet Count 252 164 - 446 K/uL    " MPV 9.6 9.0 - 12.9 fL    Neutrophils-Polys 78.30 (H) 44.00 - 72.00 %    Lymphocytes 12.60 (L) 22.00 - 41.00 %    Monocytes 7.20 0.00 - 13.40 %    Eosinophils 0.60 0.00 - 6.90 %    Basophils 0.80 0.00 - 1.80 %    Immature Granulocytes 0.50 0.00 - 0.90 %    Nucleated RBC 0.00 /100 WBC    Neutrophils (Absolute) 12.07 (H) 1.82 - 7.42 K/uL    Lymphs (Absolute) 1.94 1.00 - 4.80 K/uL    Monos (Absolute) 1.11 (H) 0.00 - 0.85 K/uL    Eos (Absolute) 0.10 0.00 - 0.51 K/uL    Baso (Absolute) 0.13 (H) 0.00 - 0.12 K/uL    Immature Granulocytes (abs) 0.07 0.00 - 0.11 K/uL    NRBC (Absolute) 0.00 K/uL   COMP METABOLIC PANEL    Collection Time: 17 12:51 AM   Result Value Ref Range    Sodium 135 135 - 145 mmol/L    Potassium 4.3 3.6 - 5.5 mmol/L    Chloride 100 96 - 112 mmol/L    Co2 22 20 - 33 mmol/L    Anion Gap 13.0 (H) 0.0 - 11.9    Glucose 91 65 - 99 mg/dL    Bun 10 8 - 22 mg/dL    Creatinine 0.81 0.50 - 1.40 mg/dL    Calcium 9.5 8.5 - 10.5 mg/dL    AST(SGOT) 25 12 - 45 U/L    ALT(SGPT) 26 2 - 50 U/L    Alkaline Phosphatase 79 30 - 99 U/L    Total Bilirubin 0.3 0.1 - 1.5 mg/dL    Albumin 4.6 3.2 - 4.9 g/dL    Total Protein 8.2 6.0 - 8.2 g/dL    Globulin 3.6 (H) 1.9 - 3.5 g/dL    A-G Ratio 1.3 g/dL   LIPASE    Collection Time: 17 12:51 AM   Result Value Ref Range    Lipase 25 11 - 82 U/L   TROPONIN    Collection Time: 17 12:51 AM   Result Value Ref Range    Troponin I <0.01 0.00 - 0.04 ng/mL   ESTIMATED GFR    Collection Time: 17 12:51 AM   Result Value Ref Range    GFR If African American >60 >60 mL/min/1.73 m 2    GFR If Non African American >60 >60 mL/min/1.73 m 2   EKG (ER)    Collection Time: 17 12:58 AM   Result Value Ref Range    Report       [facility] Emergency Dept.    Test Date:  2017  Pt Name:    MINA VARGHESE              Department: ER  MRN:        [id number]                      Room:  Gender:     M                            Technician: [id number]  :         1960                   Requested By:ER TRIAGE PROTOCOL  Order #:    [id number]                    Reading MD:    Measurements  Intervals                                Axis  Rate:       107                          P:          50  CO:         172                          QRS:        0  QRSD:       80                           T:          70  QT:         348  QTc:        465    Interpretive Statements  SINUS TACHYCARDIA  ST ELEVATION, PROBABLE INFERIOR INJURY  BORDERLINE R WAVE PROGRESSION, ANTERIOR LEADS  Compared to ECG 2012 09:04:19  ST (T wave) deviation now present  Myocardial infarct finding now present     D-DIMER    Collection Time: 17  1:20 AM   Result Value Ref Range    D-Dimer Screen 299 (H) <250 ng/mL(D-DU)   CRP HIGH SENSITIVE (CARDIAC)    Collection Time: 17  1:20 AM   Result Value Ref Range    C Reactive Protein High Sensitive 1.4 0.0 - 7.5 mg/L   EKG (ER)    Collection Time: 17  3:00 AM   Result Value Ref Range    Report       [facility] Emergency Dept.    Test Date:  2017  Pt Name:    MINA VARGHESE              Department: ER  MRN:        [id number]                      Room:        11  Gender:     M                            Technician: [id number]  :        1960                   Requested By:JAVID QUINTANA  Order #:    [id number]                    Reading MD:    Measurements  Intervals                                Axis  Rate:       111                          P:          56  CO:         176                          QRS:        4  QRSD:       90                           T:          68  QT:         328  QTc:        446    Interpretive Statements  SINUS TACHYCARDIA  ST ELEVATION, PROBABLE INFERIOR INJURY  LATERAL LEADS ARE ALSO INVOLVED  Compared to ECG 2017 00:58:15  No significant changes     TROPONIN    Collection Time: 17  3:22 AM   Result Value Ref Range    Troponin I <0.01 0.00 - 0.04 ng/mL   LACTIC ACID     Collection Time: 01/11/17  5:52 AM   Result Value Ref Range    Lactic Acid 2.4 (H) 0.5 - 2.0 mmol/L   INFLUENZA BY PCR, A/B/H1N1    Collection Time: 01/11/17  9:43 AM   Result Value Ref Range    Influenza virus A RNA Negative Negative    Influenza virus B RNA Negative Negative    Influenza A 2009, H1N1, PCR Not Detected Negative   Lactic Acid Every four hours after STAT order    Collection Time: 01/11/17  9:43 AM   Result Value Ref Range    Lactic Acid 2.1 (H) 0.5 - 2.0 mmol/L   Urinalysis    Collection Time: 01/11/17  9:45 AM   Result Value Ref Range    Micro Urine Req see below     Color Yellow     Character Clear     Specific Gravity >1.035 (A) <1.035    Ph 5.0 5.0-8.0    Glucose Negative Negative mg/dL    Ketones 20 (A) Negative mg/dL    Protein Negative Negative mg/dL    Bilirubin Negative Negative    Nitrite Negative Negative    Leukocyte Esterase Negative Negative    Occult Blood Negative Negative   ECHOCARDIOGRAM COMP W/O CONT    Collection Time: 01/11/17  2:39 PM   Result Value Ref Range    Eject.Frac. MOD BP 57.16     Eject.Frac. MOD 4C 59.14     Eject.Frac. MOD 2C 49.11     Left Ventrical Ejection Fraction 55    LACTIC ACID    Collection Time: 01/11/17  3:55 PM   Result Value Ref Range    Lactic Acid 1.7 0.5 - 2.0 mmol/L   TROPONIN    Collection Time: 01/11/17  6:01 PM   Result Value Ref Range    Troponin I <0.01 0.00 - 0.04 ng/mL   PHOSPHORUS    Collection Time: 01/11/17  6:02 PM   Result Value Ref Range    Phosphorus 2.4 (L) 2.5 - 4.5 mg/dL   COMP METABOLIC PANEL    Collection Time: 01/11/17  6:02 PM   Result Value Ref Range    Sodium 137 135 - 145 mmol/L    Potassium 4.4 3.6 - 5.5 mmol/L    Chloride 107 96 - 112 mmol/L    Co2 22 20 - 33 mmol/L    Anion Gap 8.0 0.0 - 11.9    Glucose 115 (H) 65 - 99 mg/dL    Bun 11 8 - 22 mg/dL    Creatinine 0.78 0.50 - 1.40 mg/dL    Calcium 8.6 8.5 - 10.5 mg/dL    AST(SGOT) 17 12 - 45 U/L    ALT(SGPT) 18 2 - 50 U/L    Alkaline Phosphatase 58 30 - 99 U/L    Total  Bilirubin 0.5 0.1 - 1.5 mg/dL    Albumin 3.7 3.2 - 4.9 g/dL    Total Protein 6.6 6.0 - 8.2 g/dL    Globulin 2.9 1.9 - 3.5 g/dL    A-G Ratio 1.3 g/dL   MAGNESIUM    Collection Time: 17  6:02 PM   Result Value Ref Range    Magnesium 2.1 1.5 - 2.5 mg/dL   ESTIMATED GFR    Collection Time: 17  6:02 PM   Result Value Ref Range    GFR If African American >60 >60 mL/min/1.73 m 2    GFR If Non African American >60 >60 mL/min/1.73 m 2   EKG (ER)    Collection Time: 17  6:04 PM   Result Value Ref Range    Report       [facility]    Test Date:  2017  Pt Name:    MINA VARGHESE              Department: ER  MRN:        [id number]                      Room:       [facility]  Gender:     M                            Technician: [facility]  :        1960                   Requested By:JAVID QUINTANA  Order #:    [id number]                    Reading MD: Jc Mcdaniel MD    Measurements  Intervals                                Axis  Rate:       98                           P:          47  VA:         164                          QRS:        21  QRSD:       88                           T:          60  QT:         360  QTc:        460    Interpretive Statements  SINUS RHYTHM  ST ELEVATION, PROBABLE LATERAL INJURY  ST ELEVATION, CONSIDER INFERIOR INJURY  Compared to ECG 2017 03:00:17  Sinus tachycardia no longer present  ST (T wave) deviation still present  Myocardial infarct finding still present    Electronically Signed On 2017 18:27:58 PST by Jc Mcdaniel MD         Imaging/Procedures Review:    ndependant Imaging Review: Completed  ECHOCARDIOGRAM COMP W/O CONT   Final Result      US-GALLBLADDER   Final Result         1.  Mild hepatomegaly with echogenic liver suggests fatty change versus fibrosis.   2.  Mild atherosclerosis.   3.  Borderline gallbladder wall thickening, otherwise unremarkable gallbladder         CT-ABDOMEN-PELVIS WITH   Final Result         1.  No acute  abnormality.   2.  Trace pericardial effusion   3.  Hepatomegaly and diffuse hepatic steatosis.   4.  Atherosclerosis and atherosclerotic coronary artery disease   5.  Diverticulosis      DX-ABDOMEN COMPLETE WITH AP OR PA CXR   Final Result         1.  No acute cardiopulmonary disease is evident.   2.  Hyperexpansion of lungs compatible with changes of COPD.   3.  Nonspecific bowel gas pattern.               EKG:   EKG Independant Review: Completed  QTc:465, HR: 107, Normal Sinus Rhythm, no ST/T changes     (Yes) Records reviewed and summarized in current documentation             Assessment/Plan     Epigastric abdominal pain  Assessment & Plan  - Likely Pericarditis vs. Gastritis   - Patient presented with tachycardia and tachypnea however saturating in 90's on room air  - Lactic acid: 2.4-->2.1->1.7, Leucocytosis (15.4) with left shift    - CT abdomen showed trace pericardial effusion and hepatomegaly and diffuse hepatic steatosis  - Echo was negative for cardiac disease  - 3 sets of trop negative.   -  EKG with ST elevations more prominently in inferior and V5, V6 leads-> Dr. Fowler evaluated patient in ED and felt that these findings were more consistent with pericarditis  - Patient given 325mg aspirin in the ER  - Repeat EKG; continue daily aspirin    - ESR and CRP    - Patient now on omeprazole 20mg and GI cocktail  - Cautious to give alcoholic NSAID's due to possibility of exacerbating gastritis, but  given that he continues to have pain which is quite typical and EKG findings of likely pericarditis we decided to start patient on ibuprofen and colchicine       Chest pain  Assessment & Plan  - Likely Pericarditis vs. Gastritis   - Patient presented with tachycardia and tachypnea however saturating in 90's on room air  - Lactic acid: 2.4-->2.1->1.7, Leucocytosis (15.4) with left shift    - CT abdomen showed trace pericardial effusion and hepatomegaly and diffuse hepatic steatosis  - Echo was negative for  cardiac disease  - 3 sets of trop negative.   -  EKG with ST elevations more prominently in inferior and V5, V6 leads-> Dr. Fowler evaluated patient in ED and felt that these findings were more consistent with pericarditis  - Patient given 325mg aspirin in the ER  - Repeat EKG; continue daily aspirin    - ESR and CRP    - Patient now on omeprazole 20mg and GI cocktail  - Cautious to give alcoholic NSAID's due to possibility of exacerbating gastritis, but  given that he continues to have pain which is quite typical and EKG findings of likely pericarditis we decided to start patient on ibuprofen and colchicine       Alcohol abuse  Assessment & Plan  Heavy drinker with about 3 (16 oz beers) and a shot of vodka daily  Will place patient on CIWA with [facility] protocol if patient shows signs of withdrawal             Anticipated Hospital stay:  >2 midnights        Quality Measures  EKG reviewed, Labs reviewed, Medications reviewed and Radiology images reviewed  Conn catheter: No Conn      DVT Prophylaxis: Enoxaparin (Lovenox)

## 2017-01-12 NOTE — PROGRESS NOTES
Cardiology Progress Note               Author: Aydee To Date & Time created: 1/12/2017  10:54 AM     Interval History:  No telemetry events.  No acute events overnight.    Chest pain relieved with NSAID    Review of Systems   Constitutional: Negative for fever, chills, weight loss and malaise/fatigue.   HENT: Negative for ear discharge, ear pain, hearing loss and nosebleeds.    Eyes: Negative for blurred vision, double vision, pain and discharge.   Respiratory: Negative for cough and shortness of breath.    Cardiovascular: Negative for chest pain, palpitations, orthopnea, claudication, leg swelling and PND.   Gastrointestinal: Negative for nausea, vomiting, abdominal pain, blood in stool and melena.   Genitourinary: Negative for dysuria and hematuria.   Musculoskeletal: Negative for myalgias, joint pain and falls.   Skin: Negative for itching and rash.   Neurological: Negative for dizziness, sensory change, speech change, loss of consciousness and headaches.   Endo/Heme/Allergies: Negative for environmental allergies. Does not bruise/bleed easily.   Psychiatric/Behavioral: Negative for depression, suicidal ideas and hallucinations.       Physical Exam   Constitutional: He is oriented to person, place, and time. He appears well-developed and well-nourished.   HENT:   Head: Normocephalic and atraumatic.   Eyes: EOM are normal.   Neck: Normal range of motion. No JVD present.   Cardiovascular: Normal rate, regular rhythm, normal heart sounds and intact distal pulses.  Exam reveals no gallop and no friction rub.    No murmur heard.  Bilateral femoral pulses are 2+, bilateral dorsalis pedis pulses are 2+, bilateral posterior tibialis pulses are 2+.   Pulmonary/Chest: No respiratory distress. He has no wheezes. He has no rales. He exhibits no tenderness.   Abdominal: Soft. Bowel sounds are normal. There is no tenderness. There is no rebound and no guarding.   The is no presence of abdominal bruits    Musculoskeletal: Normal range of motion.   Neurological: He is alert and oriented to person, place, and time.   Skin: Skin is warm and dry.   Psychiatric: He has a normal mood and affect.   Nursing note and vitals reviewed.      Hemodynamics:  Temp (24hrs), Av.7 °C (98.1 °F), Min:36.3 °C (97.3 °F), Max:37.1 °C (98.8 °F)  Temperature: 36.6 °C (97.8 °F)  Pulse  Av.9  Min: 84  Max: 116Heart Rate (Monitored): 92  Blood Pressure: 126/82 mmHg, NIBP: 127/97 mmHg     Respiratory:    Respiration: 16, Pulse Oximetry: 94 %, O2 Daily Delivery Respiratory : Silicone Nasal Cannula     Work Of Breathing / Effort: Mild  RUL Breath Sounds: Clear;Diminished, RML Breath Sounds: Clear;Diminished, RLL Breath Sounds: Clear;Diminished, HARSHA Breath Sounds: Clear;Diminished, LLL Breath Sounds: Clear;Diminished  Fluids:     Weight: 69.6 kg (153 lb 7 oz)  GI/Nutrition:  Orders Placed This Encounter   Procedures   • DIET ORDER     Standing Status: Standing      Number of Occurrences: 1      Standing Expiration Date:      Order Specific Question:  Diet:     Answer:  Regular [1]     Lab Results:  Recent Labs      17   00517   0203   WBC  15.4*  7.6   RBC  4.69*  3.64*   HEMOGLOBIN  16.1  12.5*   HEMATOCRIT  46.0  35.7*   MCV  98.1*  98.1*   MCH  34.3*  34.3*   MCHC  35.0  35.0   RDW  46.7  47.7   PLATELETCT  252  151*   MPV  9.6  10.2     Recent Labs      17   0051  17   1802  17   0203   SODIUM  135  137  138   POTASSIUM  4.3  4.4  3.9   CHLORIDE  100  107  107   CO2  22  22  27   GLUCOSE  91  115*  96   BUN  10  11  10   CREATININE  0.81  0.78  0.75   CALCIUM  9.5  8.6  8.6             Recent Labs      17   0051  17   0322  17   1801   TROPONINI  <0.01  <0.01  <0.01             Medical Decision Making, by Problem:  Active Hospital Problems    Diagnosis   • Pericarditis [I31.9]   • Epigastric abdominal pain [R10.13]   • Chest pain [R07.9]   • Alcohol abuse [F10.10]       Plan:    TTE  is relatively normal.  No further cardiac work up at this time.  Cont current medications at current dose.   Continue NSAID if no contraindication for at least 1 month.    Will sign off at this time, please call us with further questions.  Patient will be followed in the outpatient cardiology clinic for further cardiac care.    Thank you for referring this patient to our cardiology service.    Aydee Rai MD.  [facility].      EKG reviewed, Medications reviewed, Labs reviewed and Radiology images reviewed

## 2017-01-12 NOTE — ASSESSMENT & PLAN NOTE
Heavy drinker with about 3 (16 oz beers) and a shot of vodka daily  Will place patient on CIWA with [facility] protocol if patient shows signs of withdrawal

## 2017-01-12 NOTE — PROGRESS NOTES
Assumed pt care. Received bedside report from LUIS Dalal. AM assessment completed. AAOx4. Pt denies SOB; c/o pain of 0 on 0 to 10 pain scale. Provided pt with RN and CNA extension numbers on white board and encouraged pt to call when needed. Discussed plan of care for the day, pt verbalizes understanding. VSS. Denies any additional needs at this time. Call light, belongings, and phone within reach. Hourly rounding in effect. Will continue to monitor.

## 2017-01-12 NOTE — CARE PLAN
Problem: Safety  Goal: Will remain free from injury  Outcome: PROGRESSING AS EXPECTED  Fall precautions in place, pt educated to call for assistance, pt calling appropriately    Problem: Knowledge Deficit  Goal: Knowledge of disease process/condition, treatment plan, diagnostic tests, and medications will improve  Outcome: PROGRESSING AS EXPECTED  Plan of care for day discussed with pt at bedside, all questions answered, pt verbalized understanding

## 2017-01-12 NOTE — PROGRESS NOTES
"Rapid was called by nurse who felt she saw possible \"tombstoning\" on cardiac monitor. [facility] was called and evaluated patient. Patient was complaining of epigastric pain, at the time of the cardiac finding. Vitals were stable. Patient denied chest pain at that time. EKG was performed which looked similar to earlier EKG's but [facility] decided to order repeat troponin (Dr. Lawrence also reviewed EKG and agreed with decision). Troponin came back negative. Eventually abdominal pain subsided on it's own. He was eventually given a GI cocktail with his PPI to help alleviate his epigastric pain, We will continue to monitor patient.  "

## 2017-01-13 ENCOUNTER — PATIENT OUTREACH (OUTPATIENT)
Dept: HEALTH INFORMATION MANAGEMENT | Facility: OTHER | Age: 57
End: 2017-01-13

## 2017-01-13 NOTE — PROGRESS NOTES
Pt discharged home via walking, refused transport escort with bus pass, all discharge teaching done, all prescriptions given to pt, pt encouraged to follow up with clinic, educated on when to return with worsening symptoms, all questions answered, all lines, drains, and monitors removed

## 2017-01-16 LAB
BACTERIA BLD CULT: ABNORMAL
BACTERIA BLD CULT: ABNORMAL
BACTERIA BLD CULT: NORMAL
SIGNIFICANT IND 70042: ABNORMAL
SIGNIFICANT IND 70042: NORMAL
SITE SITE: ABNORMAL
SITE SITE: NORMAL
SOURCE SOURCE: ABNORMAL
SOURCE SOURCE: NORMAL

## 2017-04-21 LAB — EKG IMPRESSION: NORMAL

## 2020-07-28 ENCOUNTER — HOSPITAL ENCOUNTER (EMERGENCY)
Facility: MEDICAL CENTER | Age: 60
End: 2020-07-28
Attending: EMERGENCY MEDICINE
Payer: MEDICAID

## 2020-07-28 ENCOUNTER — APPOINTMENT (OUTPATIENT)
Dept: RADIOLOGY | Facility: MEDICAL CENTER | Age: 60
End: 2020-07-28
Attending: EMERGENCY MEDICINE
Payer: MEDICAID

## 2020-07-28 VITALS
BODY MASS INDEX: 20.48 KG/M2 | OXYGEN SATURATION: 96 % | SYSTOLIC BLOOD PRESSURE: 138 MMHG | TEMPERATURE: 98.5 F | WEIGHT: 151.24 LBS | RESPIRATION RATE: 18 BRPM | HEIGHT: 72 IN | HEART RATE: 90 BPM | DIASTOLIC BLOOD PRESSURE: 84 MMHG

## 2020-07-28 DIAGNOSIS — S22.080A T12 COMPRESSION FRACTURE, INITIAL ENCOUNTER (HCC): ICD-10-CM

## 2020-07-28 PROCEDURE — 72131 CT LUMBAR SPINE W/O DYE: CPT

## 2020-07-28 PROCEDURE — L0150 CERV SEMI-RIG ADJ MOLDED CHN: HCPCS

## 2020-07-28 PROCEDURE — L0458 TLSO 2MOD SYMPHIS-XIPHO PRE: HCPCS

## 2020-07-28 PROCEDURE — 99283 EMERGENCY DEPT VISIT LOW MDM: CPT

## 2020-07-28 RX ORDER — HYDROCODONE BITARTRATE AND ACETAMINOPHEN 5; 325 MG/1; MG/1
1 TABLET ORAL EVERY 4 HOURS PRN
Qty: 15 TAB | Refills: 0 | Status: SHIPPED | OUTPATIENT
Start: 2020-07-28 | End: 2020-07-31

## 2020-07-28 NOTE — ED TRIAGE NOTES
Chief Complaint   Patient presents with   • Low Back Pain     moving , lifting dumpster 2wks ago     Pt ambulated to triage , c/o low back pain , pt reports lifting & moving dumpster 2wks ago. Cms+

## 2020-07-28 NOTE — ED PROVIDER NOTES
"ED Provider Note    CHIEF COMPLAINT  Chief Complaint   Patient presents with   • Low Back Pain     moving , lifting dumpster 2wks ago       HPI  Heriberto Jenkins is a 59 y.o. male who presents to emerge department planing of low back pain.  He explains a he was helping a friend move a mattress up a stairwell.  This was quite difficult for the to gentleman especially with the other person being older and less helpful.  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  This also has seemingly aggravated his back pain since the initial mattress moving episode.  Denies any significant past medical history.  He has been taking some Tylenol in the evening with minimal relief.  Denies any changes in bowel or bladder.  No numbness or tingling.  Movement does exacerbate the discomfort.    REVIEW OF SYSTEMS  See HPI for further details. All other systems are negative.     PAST MEDICAL HISTORY   has a past medical history of Pericarditis (1/12/2017).    SOCIAL HISTORY  Social History     Tobacco Use   • Smoking status: Current Every Day Smoker     Packs/day: 1.00     Types: Cigarettes   Substance and Sexual Activity   • Alcohol use: Yes     Comment: daily beer approx 6 to 7 and vodka (\"(If I have no beer\") approx 1/4 bottle   • Drug use: Not on file   • Sexual activity: Not on file       SURGICAL HISTORY   has a past surgical history that includes hernia repair; other; submandible abscess incision and drainage (3/25/2012); and dental extraction(s) (3/25/2012).    CURRENT MEDICATIONS  Home Medications     Reviewed by Gabi Almaguer R.N. (Registered Nurse) on 07/28/20 at 1253  Med List Status: Complete   Medication Last Dose Status        Patient Nilson Taking any Medications                       ALLERGIES  No Known Allergies    PHYSICAL EXAM  VITAL SIGNS: /84   Pulse 90   Temp 36.9 °C (98.5 °F)   Resp 18   Ht 1.829 m (6')   Wt 68.6 kg (151 lb 3.8 oz)   SpO2 96%   BMI " 20.51 kg/m²  @DIAMANTE[[id number]::@  Pulse ox interpretation: I interpret this pulse ox as normal.  Constitutional: Alert in no apparent distress.  HENT: Normocephalic, Atraumatic, Bilateral external ears normal. Nose normal.   Eyes: Pupils are equal and reactive. Conjunctiva normal, non-icteric.   Lungs: No respiratory distress  Back: Nontender T spine.  Tender L2-3-4 midline.  : No saddle anesthesia  Skin: Warm, Dry, No erythema, No rash.   Neurologic: Alert, Grossly non-focal.   Psychiatric: Affect normal, Judgment normal, Mood normal, Appears appropriate and not intoxicated.       CT-LSPINE W/O PLUS RECONS   Final Result      Moderate burst compression fracture of T12 with minimal retropulsion and no significant spinal canal stenosis.      Colonic diverticulosis.      Atherosclerotic plaque.                 In prescribing controlled substances to this patient, I certify that I have obtained and reviewed the medical history of Heriberto Jenkins. I have also made a good sher effort to obtain applicable records from other providers who have treated the patient and no other records are available at this time.     I have conducted a physical exam and documented it. I have reviewed Mr. Jenkins’s prescription history as maintained by the [place] Prescription Monitoring Program.     I have assessed the patient’s risk for abuse, dependency, and addiction using the validated Opioid Risk Tool available at https://www.mdcalc.com/nfrpah-mqdy-hkyv-ort-narcotic-abuse.     Given the above, I believe the benefits of controlled substance therapy outweigh the risks. The reasons for prescribing controlled substances include non-narcotic, oral analgesic alternatives have been inadequate for pain control. Accordingly, I have discussed the risk and benefits, treatment plan, and alternative therapies with the patient.         COURSE & MEDICAL DECISION MAKING  Pertinent Labs & Imaging studies reviewed. (See chart for details)  Patient  to the emergency room with back pain after lifting mattress few days ago.  History as above.  CT imaging confirming T12 compression fracture.  Patient has been placed in TLSO has been referred to Dr. Diez on-call for neurosurgery.  Patient is neurovascularly intact on exam here today.  Is understanding return precautions if needed.      The patient will not drink alcohol nor drive with prescribed medications. The patient will return for worsening symptoms and is stable at the time of discharge. The patient verbalizes understanding and will comply.    FINAL IMPRESSION  1. T12 compression fracture, initial encounter (HCC)               Electronically signed by: Parminder Tavarez M.D., 7/28/2020 2:04 PM

## 2021-06-08 ENCOUNTER — PATIENT OUTREACH (OUTPATIENT)
Dept: HEALTH INFORMATION MANAGEMENT | Facility: OTHER | Age: 61
End: 2021-06-08

## 2021-06-08 ENCOUNTER — HOSPITAL ENCOUNTER (EMERGENCY)
Facility: MEDICAL CENTER | Age: 61
End: 2021-06-08
Attending: EMERGENCY MEDICINE | Admitting: EMERGENCY MEDICINE
Payer: MEDICAID

## 2021-06-08 VITALS
TEMPERATURE: 98.1 F | DIASTOLIC BLOOD PRESSURE: 70 MMHG | RESPIRATION RATE: 18 BRPM | SYSTOLIC BLOOD PRESSURE: 105 MMHG | HEIGHT: 72 IN | WEIGHT: 150 LBS | BODY MASS INDEX: 20.32 KG/M2 | HEART RATE: 85 BPM | OXYGEN SATURATION: 94 %

## 2021-06-08 DIAGNOSIS — L08.9 SKIN INFECTION: ICD-10-CM

## 2021-06-08 LAB — GLUCOSE BLD-MCNC: 86 MG/DL (ref 65–99)

## 2021-06-08 PROCEDURE — 700102 HCHG RX REV CODE 250 W/ 637 OVERRIDE(OP): Performed by: EMERGENCY MEDICINE

## 2021-06-08 PROCEDURE — 99283 EMERGENCY DEPT VISIT LOW MDM: CPT

## 2021-06-08 PROCEDURE — 82962 GLUCOSE BLOOD TEST: CPT

## 2021-06-08 PROCEDURE — A9270 NON-COVERED ITEM OR SERVICE: HCPCS | Performed by: EMERGENCY MEDICINE

## 2021-06-08 RX ORDER — SULFAMETHOXAZOLE AND TRIMETHOPRIM 800; 160 MG/1; MG/1
1 TABLET ORAL ONCE
Status: COMPLETED | OUTPATIENT
Start: 2021-06-08 | End: 2021-06-08

## 2021-06-08 RX ORDER — CEPHALEXIN 500 MG/1
500 CAPSULE ORAL 4 TIMES DAILY
Qty: 20 CAPSULE | Refills: 0 | Status: SHIPPED | OUTPATIENT
Start: 2021-06-08 | End: 2021-06-13

## 2021-06-08 RX ORDER — SULFAMETHOXAZOLE AND TRIMETHOPRIM 800; 160 MG/1; MG/1
1 TABLET ORAL 2 TIMES DAILY
Qty: 10 TABLET | Refills: 0 | Status: SHIPPED | OUTPATIENT
Start: 2021-06-08 | End: 2021-06-13

## 2021-06-08 RX ORDER — CEPHALEXIN 500 MG/1
500 CAPSULE ORAL ONCE
Status: COMPLETED | OUTPATIENT
Start: 2021-06-08 | End: 2021-06-08

## 2021-06-08 RX ADMIN — CEPHALEXIN 500 MG: 500 CAPSULE ORAL at 12:06

## 2021-06-08 RX ADMIN — SULFAMETHOXAZOLE AND TRIMETHOPRIM 1 TABLET: 800; 160 TABLET ORAL at 12:06

## 2021-06-08 NOTE — ED NOTES
Pt and family member at bedside verbalize understanding of discharge instructions and follow up/prescription . Pt able to ambulate out to ED lobby with all belongings. Pt given resources and a boxed lunch.

## 2021-06-08 NOTE — PROGRESS NOTES
06/08/21  CHW Chrissie provided Access [place] information for patient to apply for social security benefits. CHW encouraged patient to go to [facility] pharmacy and apply for prescription assistance. CHW completed encounter with RN on the phone CHW will not follow.

## 2021-06-08 NOTE — ED TRIAGE NOTES
"Chief Complaint   Patient presents with   • Wound Check     Pt bib EMS for two wound checks on his right upper arm and left lateral calf. Pt states he's had them \"for a few weeks.\" Pt thinks they may be from bed bugs. Pt is showered.    • Back Pain     Pt states that he has a T12 fracture that is a year old. He was supposed to see Dr. Diez for follow up but patient was not able to make it to his appointment.      /82   Pulse 87   Temp 36.6 °C (97.8 °F) (Temporal)   Resp 16   Ht 1.829 m (6')   Wt 68 kg (150 lb)   SpO2 92%   BMI 20.34 kg/m²     Pt ambulatory with a steady gait from shower to green 36.   "

## 2021-06-08 NOTE — ED PROVIDER NOTES
"ED Provider Note    ER PROVIDER NOTE        CHIEF COMPLAINT  Chief Complaint   Patient presents with   • Wound Check     Pt bib EMS for two wound checks on his right upper arm and left lateral calf. Pt states he's had them \"for a few weeks.\" Pt thinks they may be from bed bugs. Pt is showered.    • Back Pain     Pt states that he has a T12 fracture that is a year old. He was supposed to see Dr. Diez for follow up but patient was not able to make it to his appointment.        HPI  Heriberto Jenkins is a 60 y.o. male who presents to the emergency department complaining of 2 wounds, 1 on his leg and one on his upper arm.  Patient reports they have been a few days, he was concerned he might have bedbugs and was itchy, he has been itching several areas, and these have become more painful and red, he states no fevers or chills, no nausea or vomiting.  No other rash or lesion.  No abdominal pain, chest pain or shortness of breath.    State he has some chronic back pain from an old T12 fracture, no new pain or weakness or numbness    States no medical conditions although was told at one time he might have diabetes    REVIEW OF SYSTEMS  Pertinent positives include wounds. Pertinent negatives include no fever. See HPI for details. All other systems reviewed and are negative.    PAST MEDICAL HISTORY   has a past medical history of Pericarditis (1/12/2017).    SURGICAL HISTORY   has a past surgical history that includes hernia repair; other; submandible abscess incision and drainage (3/25/2012); and dental extraction(s) (3/25/2012).    FAMILY HISTORY  History reviewed. No pertinent family history.    SOCIAL HISTORY  Social History     Socioeconomic History   • Marital status: Single     Spouse name: Not on file   • Number of children: Not on file   • Years of education: Not on file   • Highest education level: Not on file   Occupational History   • Not on file   Tobacco Use   • Smoking status: Current Every Day Smoker     " "Packs/day: 2.00     Types: Cigarettes   • Smokeless tobacco: Never Used   Vaping Use   • Vaping Use: Never used   Substance and Sexual Activity   • Alcohol use: Yes     Comment: daily beer approx 6 to 7 and vodka (\"(If I have no beer\") approx 1/4 bottle   • Drug use: Not Currently   • Sexual activity: Not on file   Other Topics Concern   • Not on file   Social History Narrative   • Not on file     Social Determinants of Health     Financial Resource Strain:    • Difficulty of Paying Living Expenses:    Food Insecurity:    • Worried About Running Out of Food in the Last Year:    • Ran Out of Food in the Last Year:    Transportation Needs:    • Lack of Transportation (Medical):    • Lack of Transportation (Non-Medical):    Physical Activity:    • Days of Exercise per Week:    • Minutes of Exercise per Session:    Stress:    • Feeling of Stress :    Social Connections:    • Frequency of Communication with Friends and Family:    • Frequency of Social Gatherings with Friends and Family:    • Attends Yazdanism Services:    • Active Member of Clubs or Organizations:    • Attends Club or Organization Meetings:    • Marital Status:    Intimate Partner Violence:    • Fear of Current or Ex-Partner:    • Emotionally Abused:    • Physically Abused:    • Sexually Abused:       Social History     Substance and Sexual Activity   Drug Use Not Currently       CURRENT MEDICATIONS  Home Medications     Reviewed by Nayely Allen R.N. (Registered Nurse) on 06/08/21 at 1044  Med List Status: Not Addressed   Medication Last Dose Status        Patient Nilson Taking any Medications                       ALLERGIES  No Known Allergies    PHYSICAL EXAM  VITAL SIGNS: /82   Pulse 87   Temp 36.6 °C (97.8 °F) (Temporal)   Resp 16   Ht 1.829 m (6')   Wt 68 kg (150 lb)   SpO2 92%   BMI 20.34 kg/m²   Pulse ox interpretation: I interpret this pulse ox as normal.    Constitutional: Alert in no apparent distress.  HENT: No signs of " trauma, Bilateral external ears normal, Nose normal.   Eyes: Pupils are equal and reactive, Conjunctiva normal, Non-icteric.   Neck: Normal range of motion, No tenderness, Supple, No stridor.   Lymphatic: No lymphadenopathy noted.   Cardiovascular: Regular rate and rhythm, no murmurs.   Thorax & Lungs: Normal breath sounds, No respiratory distress, No wheezing, No chest tenderness.   Abdomen: Bowel sounds normal, Soft, No tenderness, No masses, No pulsatile masses. No peritoneal signs.  Skin: Warm, Dry, No erythema, No rash.  There is approximately a 3 cm area of redness with an area of eschar across the top of it that is tender, no fluctuance or induration or proximal streaking, this is on the left anterior shin.  There is an additional similar-appearing wound on his right upper extremity mid humerus on the lateral aspect, it is a few other areas of excoriation on his legs and arms although no other areas of erythema or skin breakdown  Back: No bony tenderness, No CVA tenderness.   Extremities: Intact distal pulses,  No cyanosis, Negative Esha's sign.  Musculoskeletal: Good range of motion in all major joints. No tenderness to palpation or major deformities noted.   Neurologic: Alert , Normal motor function, Normal sensory function, No focal deficits noted.   Psychiatric: Affect normal, Judgment normal, Mood normal.     DIAGNOSTIC STUDIES / PROCEDURES      LABS  Labs Reviewed   POCT GLUCOSE DEVICE RESULTS     87  All labs reviewed by me.    RADIOLOGY  No orders to display     The radiologist's interpretation of all radiological studies have been reviewed and images independently viewed by me.    COURSE & MEDICAL DECISION MAKING  Nursing notes, VS, PMSFHx reviewed in chart.    11:18 AM Patient seen and examined at bedside. Patient will be treated with Afrin, Keflex. Ordered for Accu-Chek to evaluate his symptoms.         Decision Making:  This is a 60 y.o. male presenting with skin wounds.  These do appear from  excoriation and have become secondarily infected.  He started on keflex and bactrim given their appearance.  No findings suggestive of abscess or deep space infection on his exam here.  He is overall well-appearing and afebrile without evidence of sepsis or systemic involvement.  Check his blood sugar which was 87   The patient will return for new or worsening symptoms and is stable at the time of discharge.    The patient is referred to a primary physician for blood pressure management, diabetic screening, and for all other preventative health concerns.        DISPOSITION:  Patient will be discharged home in stable condition.    FOLLOW UP:  [address] [address] [phone]  In 1 week        OUTPATIENT MEDICATIONS:  New Prescriptions    CEPHALEXIN (KEFLEX) 500 MG CAP    Take 1 capsule by mouth 4 times a day for 5 days.    SULFAMETHOXAZOLE-TRIMETHOPRIM (BACTRIM DS) 800-160 MG TABLET    Take 1 tablet by mouth 2 times a day for 5 days.         FINAL IMPRESSION  1. Skin infection         The note accurately reflects work and decisions made by me.  Evelio Coleman M.D.  6/8/2021  12:08 PM

## 2021-08-06 ENCOUNTER — HOSPITAL ENCOUNTER (EMERGENCY)
Facility: MEDICAL CENTER | Age: 61
End: 2021-08-06
Attending: EMERGENCY MEDICINE
Payer: MEDICAID

## 2021-08-06 VITALS
WEIGHT: 150 LBS | HEART RATE: 90 BPM | HEIGHT: 72 IN | SYSTOLIC BLOOD PRESSURE: 130 MMHG | BODY MASS INDEX: 20.32 KG/M2 | OXYGEN SATURATION: 97 % | TEMPERATURE: 98.7 F | RESPIRATION RATE: 16 BRPM | DIASTOLIC BLOOD PRESSURE: 88 MMHG

## 2021-08-06 DIAGNOSIS — L03.116 CELLULITIS OF LEFT LOWER EXTREMITY: ICD-10-CM

## 2021-08-06 DIAGNOSIS — B86 SCABIES: ICD-10-CM

## 2021-08-06 PROCEDURE — 90471 IMMUNIZATION ADMIN: CPT

## 2021-08-06 PROCEDURE — 99283 EMERGENCY DEPT VISIT LOW MDM: CPT

## 2021-08-06 PROCEDURE — 90715 TDAP VACCINE 7 YRS/> IM: CPT | Performed by: EMERGENCY MEDICINE

## 2021-08-06 PROCEDURE — 700111 HCHG RX REV CODE 636 W/ 250 OVERRIDE (IP): Performed by: EMERGENCY MEDICINE

## 2021-08-06 RX ORDER — PERMETHRIN 50 MG/G
CREAM TOPICAL
Qty: 30 G | Refills: 0 | Status: SHIPPED | OUTPATIENT
Start: 2021-08-06

## 2021-08-06 RX ORDER — CEPHALEXIN 500 MG/1
500 CAPSULE ORAL 4 TIMES DAILY
Qty: 28 CAPSULE | Refills: 0 | Status: SHIPPED | OUTPATIENT
Start: 2021-08-06 | End: 2021-08-13

## 2021-08-06 RX ADMIN — CLOSTRIDIUM TETANI TOXOID ANTIGEN (FORMALDEHYDE INACTIVATED), CORYNEBACTERIUM DIPHTHERIAE TOXOID ANTIGEN (FORMALDEHYDE INACTIVATED), BORDETELLA PERTUSSIS TOXOID ANTIGEN (GLUTARALDEHYDE INACTIVATED), BORDETELLA PERTUSSIS FILAMENTOUS HEMAGGLUTININ ANTIGEN (FORMALDEHYDE INACTIVATED), BORDETELLA PERTUSSIS PERTACTIN ANTIGEN, AND BORDETELLA PERTUSSIS FIMBRIAE 2/3 ANTIGEN 0.5 ML: 5; 2; 2.5; 5; 3; 5 INJECTION, SUSPENSION INTRAMUSCULAR at 04:42

## 2021-08-06 NOTE — ED TRIAGE NOTES
Chief Complaint   Patient presents with   • Bug Bite     Patient positive for bed bugs, states he was hered a few weeks ago and recieved a topical medication prescription, pt was never able to get prescripition filled. Patient has a few sores inflammed on bilateral ankles. GCS 15        BIB EMS to [facility], pt to the shower.       /83   Pulse 92   Temp 37 °C (98.6 °F) (Temporal)   Resp 17   Ht 1.829 m (6')   Wt 68 kg (150 lb)   SpO2 96%   BMI 20.34 kg/m²

## 2021-08-06 NOTE — ED PROVIDER NOTES
"ED Provider Note    CHIEF COMPLAINT  Chief Complaint   Patient presents with   • Bug Bite     Patient positive for bed bugs, states he was hered a few weeks ago and recieved a topical medication prescription, pt was never able to get prescripition filled. Patient has a few sores inflammed on bilateral ankles. GCS 15        HPI  Heriberto Jenkins is a 60 y.o. male who presents because of bug bites.  Bug bites all over his body.  He is here in the ER he reports a couple weeks ago, but it looks like it was 2 months ago with skin wounds and secondary infection.  He states that it never got better has not really gotten much worse.  Mostly bites over the upper extremities although pain and erythema over the left lower extremity.  He has not had a fever or chills.  Has not had any trauma.    REVIEW OF SYSTEMS  Pertinent negative: As above all other systems reviewed and negative    PAST MEDICAL HISTORY  Past Medical History:   Diagnosis Date   • Pericarditis 1/12/2017       SOCIAL HISTORY  Social History     Tobacco Use   • Smoking status: Current Every Day Smoker     Packs/day: 2.00     Types: Cigarettes   • Smokeless tobacco: Never Used   Vaping Use   • Vaping Use: Never used   Substance Use Topics   • Alcohol use: Yes     Comment: daily beer approx 6 to 7 and vodka (\"(If I have no beer\") approx 1/4 bottle   • Drug use: Not Currently       SURGICAL HISTORY  Past Surgical History:   Procedure Laterality Date   • SUBMANDIBLE ABSCESS INCISION AND DRAINAGE  3/25/2012    Performed by JORGE LUIS OROSCO at SURGERY [facility] ORS   • DENTAL EXTRACTION(S)  3/25/2012    Performed by JORGE LUIS OROSCO at SURGERY [facility] ORS   • HERNIA REPAIR      inguinal hernia repair at age 4 and age 7    • OTHER         ALLERGIES  No Known Allergies    PHYSICAL EXAM  VITAL SIGNS: /83   Pulse 92   Temp 37 °C (98.6 °F) (Temporal)   Resp 17   Ht 1.829 m (6')   Wt 68 kg (150 lb)   SpO2 96%   BMI 20.34 kg/m²    Constitutional: Awake " and alert. Nontoxic  HENT:  Grossly normal  Eyes: Grossly normal  Neck: Normal range of motion  Cardiovascular: Normal heart rate   Thorax & Lungs: No respiratory distress  Abdomen: Nontender  Skin: Multiple areas of bug bites and dry skin over the upper extremity without secondary infection.  There is erythema and tenderness around open sores of the left lower extremity.  There are few sores on the right lower extremity without associated significant erythema.  Extremities: Well perfused.  As mentioned above  Psychiatric: Affect normal      COURSE & MEDICAL DECISION MAKING  Patient presents with what appear to be multiple bug bites he has secondary infection primarily left lower extremity.  He will need systemic antibiotics in addition to treatment of his scabies.  He was given a prescription for permethrin and Keflex.  Stressed the importance of taking his antibiotics for this will worsen.  Patient was noted by nursing to have significant bedbug infestation while here.  Stressed that he needs to wash his skin at least once daily and keep clean.  He will return to the ER for worsening, not improving or concern.      FINAL IMPRESSION  1.  Multiple bed bug bites  2.  Cellulitis, left lower extremity secondary to #1      Disposition: home in stable condition      This dictation was created using voice recognition software. The accuracy of the dictation is limited to the abilities of the software.  The nursing notes were reviewed and certain aspects of this information were incorporated into this note.      Electronically signed by: Adrien Newton M.D., 8/6/2021 4:47 AM

## 2021-10-02 ENCOUNTER — HOSPITAL ENCOUNTER (EMERGENCY)
Facility: MEDICAL CENTER | Age: 61
End: 2021-10-02
Attending: EMERGENCY MEDICINE
Payer: MEDICAID

## 2021-10-02 VITALS
TEMPERATURE: 97 F | DIASTOLIC BLOOD PRESSURE: 82 MMHG | OXYGEN SATURATION: 96 % | BODY MASS INDEX: 18.28 KG/M2 | HEIGHT: 72 IN | HEART RATE: 78 BPM | WEIGHT: 135 LBS | RESPIRATION RATE: 18 BRPM | SYSTOLIC BLOOD PRESSURE: 106 MMHG

## 2021-10-02 DIAGNOSIS — L30.9 DERMATITIS: ICD-10-CM

## 2021-10-02 DIAGNOSIS — S09.90XA CLOSED HEAD INJURY, INITIAL ENCOUNTER: ICD-10-CM

## 2021-10-02 PROCEDURE — 99284 EMERGENCY DEPT VISIT MOD MDM: CPT

## 2021-10-02 RX ORDER — BENZOCAINE/MENTHOL 6 MG-10 MG
LOZENGE MUCOUS MEMBRANE 2 TIMES DAILY
Status: DISCONTINUED | OUTPATIENT
Start: 2021-10-02 | End: 2021-10-02 | Stop reason: HOSPADM

## 2021-10-02 ASSESSMENT — LIFESTYLE VARIABLES
CONSUMPTION TOTAL: POSITIVE
DO YOU DRINK ALCOHOL: YES
ON A TYPICAL DAY WHEN YOU DRINK ALCOHOL HOW MANY DRINKS DO YOU HAVE: 10
EVER FELT BAD OR GUILTY ABOUT YOUR DRINKING: NO
HOW MANY TIMES IN THE PAST YEAR HAVE YOU HAD 5 OR MORE DRINKS IN A DAY: 365
EVER HAD A DRINK FIRST THING IN THE MORNING TO STEADY YOUR NERVES TO GET RID OF A HANGOVER: NO
TOTAL SCORE: 0
HAVE YOU EVER FELT YOU SHOULD CUT DOWN ON YOUR DRINKING: NO
HAVE PEOPLE ANNOYED YOU BY CRITICIZING YOUR DRINKING: NO
AVERAGE NUMBER OF DAYS PER WEEK YOU HAVE A DRINK CONTAINING ALCOHOL: 7
TOTAL SCORE: 0
TOTAL SCORE: 0

## 2021-10-02 ASSESSMENT — PAIN DESCRIPTION - PAIN TYPE: TYPE: ACUTE PAIN

## 2021-10-02 NOTE — ED NOTES
Pt discharged with instructions Pt verbalizes the understanding of instructions. Pt ambulated out of ER without any difficulty.

## 2021-10-02 NOTE — ED PROVIDER NOTES
ED Provider Note    CHIEF COMPLAINT  Chief Complaint   Patient presents with   • GLF   • Head Injury   • Bug Bite       HPI  Heriberto Jenkins is a 60 y.o. male who presents after falling today.  Patient was outside near the [place] [facility], there apparently was some music being played and he was dancing, he lost his balance and fell striking his head.  At the time patient had been drinking.  Patient denies any associated loss of consciousness.  Reports this occurred around an 4 hours ago.  Patient has been ambulatory since without issue.  Denies any associated nausea or vomiting.  Patient has a history of alcohol use but denies any use of anticoagulants.  Patient denies associated neck or back pain.  Patient remembers the entire event.  There was no associated seizure.  Patient denies any associated weakness or numbness.  Additionally patient reports she has an ongoing rash on his low back and bilateral lower extremities which she reports he is taking permethrin for multiple times, he reports it is persistent.    REVIEW OF SYSTEMS  ROS  See HPI for further details. All other systems are negative.     PAST MEDICAL HISTORY   has a past medical history of Pericarditis (1/12/2017).    SOCIAL HISTORY  Social History     Tobacco Use   • Smoking status: Current Every Day Smoker     Packs/day: 2.00     Types: Cigarettes   • Smokeless tobacco: Never Used   Vaping Use   • Vaping Use: Never used   Substance and Sexual Activity   • Alcohol use: Yes     Comment: 2- 32 oz beers and a few shots of vodka daily   • Drug use: Not Currently   • Sexual activity: Not on file       SURGICAL HISTORY   has a past surgical history that includes hernia repair; other; submandible abscess incision and drainage (3/25/2012); and dental extraction(s) (3/25/2012).    CURRENT MEDICATIONS  Home Medications     Reviewed by Kassie Knowles R.N. (Registered Nurse) on 10/02/21 at 0601  Med List Status: Partial   Medication Last Dose Status    permethrin (ELIMITE) 5 % Cream  Active                ALLERGIES  No Known Allergies    PHYSICAL EXAM  Vitals:    10/02/21 0552   BP: 111/78   Pulse: 96   Resp: 16   Temp: 35.9 °C (96.7 °F)   SpO2: 95%       Physical Exam  Constitutional:       Appearance: He is well-developed.   HENT:      Head:      Comments: Small abrasion overlying patient's left forehead with very small associated hematoma, no scalp hematoma otherwise, no bernstein sign, no raccoon eye  Eyes:      Conjunctiva/sclera: Conjunctivae normal.   Pulmonary:      Effort: Pulmonary effort is normal.   Musculoskeletal:      Cervical back: Normal range of motion and neck supple.      Comments: Cervical, thoracic, lumbar spine clear of any tenderness to palpation.  Patient ambulatory.  Strength is 5 out of 5 in distal and proximal upper and lower extremities.   Skin:     General: Skin is warm.      Comments: Patient has some scattered excoriations with some very small associated scattered papules of the bilateral lower extremities, no surrounding erythema induration or focal area of fluctuance.  Similar rashes also seen on patient's back.  Of note patient's pant legs are both very wet as well as the bottom of his shirt near his back.   Neurological:      Mental Status: He is alert and oriented to person, place, and time.   Psychiatric:         Behavior: Behavior normal.           COURSE & MEDICAL DECISION MAKING  Pertinent Labs & Imaging studies reviewed. (See chart for details)    From a trauma standpoint I believe patient is low risk for ICH, he recalls the entire event.  He denies any associated loss of coarseness.  He has not vomited.  He does not have any scalp hematoma overlying more sensitive areas of the skull, only a frontal hematoma.  Patient appears very well.  He is ambulatory.  He is no longer intoxicated and is clinically sober.  Patient rash does not appear consistent with scabies, rather I believe this is likely dermatitis secondary to  patient's clothing, as it is very wet in these areas.  I have instructed him to keep the area dry and given him a course of hydrocortisone cream to help with his symptoms.  I have discussed return precautions and encouraged him to follow-up with [facility] clinic.  We will attempt to get patient some better fitting clothes.    The patient will not drink alcohol nor drive with prescribed medications. The patient will return for worsening symptoms and is stable at the time of discharge. The patient verbalizes understanding and will comply.    FINAL IMPRESSION    1. Dermatitis    2. Closed head injury, initial encounter               Electronically signed by: Evelio Christianson M.D., 10/2/2021 6:11 AM

## 2021-10-02 NOTE — ED TRIAGE NOTES
Chief Complaint   Patient presents with   • GLF   • Head Injury   • Bug Bite     Patient bib [facility] to triage. Patient states he had a Mechanical GLF r/t alcohol. Patient denies loc, midline neck pain, and does not take any blood thinners. Hematoma observed to Right forehead. Patient says that he normally drinks two 32 oz beers and several shots of vodka daily. He also endorses not being able to fill his permethrin for scabies.

## 2022-04-28 ENCOUNTER — HOSPITAL ENCOUNTER (EMERGENCY)
Facility: MEDICAL CENTER | Age: 62
End: 2022-04-28
Attending: EMERGENCY MEDICINE
Payer: MEDICAID

## 2022-04-28 VITALS
RESPIRATION RATE: 18 BRPM | HEIGHT: 72 IN | OXYGEN SATURATION: 95 % | HEART RATE: 72 BPM | DIASTOLIC BLOOD PRESSURE: 96 MMHG | SYSTOLIC BLOOD PRESSURE: 135 MMHG | TEMPERATURE: 97.7 F | BODY MASS INDEX: 18.28 KG/M2 | WEIGHT: 135 LBS

## 2022-04-28 DIAGNOSIS — F10.920 ACUTE ALCOHOLIC INTOXICATION WITHOUT COMPLICATION (HCC): ICD-10-CM

## 2022-04-28 LAB — ETHANOL BLD-MCNC: 373.5 MG/DL (ref 0–10)

## 2022-04-28 PROCEDURE — 82077 ASSAY SPEC XCP UR&BREATH IA: CPT

## 2022-04-28 PROCEDURE — 99284 EMERGENCY DEPT VISIT MOD MDM: CPT

## 2022-04-28 NOTE — ED NOTES
.  Chief Complaint   Patient presents with   • ETOH Intoxication     Patient admits to heavy ETOH use today     ./68   Pulse 75   Temp 36.3 °C (97.3 °F) (Temporal)   Resp 16   Ht 1.829 m (6')   Wt 61.2 kg (135 lb)   SpO2 96%   BMI 18.31 kg/m²     BIBA from [facility] reportedly for detox, per EMS patient ambulatory, patient admits to ETOH use today, AAO to self, unsure why he wanted to be taken to ED today.

## 2022-04-28 NOTE — ED PROVIDER NOTES
ED Provider Note    CHIEF COMPLAINT  Chief Complaint   Patient presents with   • ETOH Intoxication     Patient admits to heavy ETOH use today   • Detox       HPI  Heriberto Jenkins is a 61 y.o. male who presents for evaluation of alcohol intoxication.  Patient arrives from a homeless shelter after drinking.  He admits to heavy drinking but cannot interact very much beyond this.  He states he is in no pain and wants to sleep.  He denies any attempt at self-harm.    REVIEW OF SYSTEMS -most ROS were obtained on reevaluation after patient had sobered  Constitutional: No fevers or chills  Skin: No rashes, abrasions, or lacerations  HEENT: No sore throat, runny nose  Neck: No neck pain  Chest: No pain   Pulm: No shortness of breath, cough  Gastrointestinal: No nausea, vomiting, diarrhea, or abdominal pain.  Musculoskeletal: No recent trauma, pain, swelling, or focal weakness  Neurologic: No sensory or motor changes. No confusion or disorientation.  Psych: Denies any attempted self-harm.      PAST FAM HISTORY  History reviewed. No pertinent family history.    PAST MEDICAL HISTORY   has a past medical history of Pericarditis (1/12/2017).    SOCIAL HISTORY  Social History     Tobacco Use   • Smoking status: Current Every Day Smoker     Packs/day: 2.00     Types: Cigarettes   • Smokeless tobacco: Never Used   Vaping Use   • Vaping Use: Never used   Substance and Sexual Activity   • Alcohol use: Yes     Comment: 2- 32 oz beers and a few shots of vodka daily   • Drug use: Not Currently   • Sexual activity: Not on file       SURGICAL HISTORY   has a past surgical history that includes hernia repair; other; submandible abscess incision and drainage (3/25/2012); and dental extraction(s) (3/25/2012).    CURRENT MEDICATIONS  Home Medications     Reviewed by Katlin Becker R.N. (Registered Nurse) on 04/28/22 at 1535  Med List Status: Unable to Obtain   Medication Last Dose Status   permethrin (ELIMITE) 5 % Cream  Active                 ALLERGIES  No Known Allergies    PHYSICAL EXAM  VITAL SIGNS: /96   Pulse 72   Temp 36.5 °C (97.7 °F) (Temporal)   Resp 18   Ht 1.829 m (6')   Wt 61.2 kg (135 lb)   SpO2 95%   BMI 18.31 kg/m²    Gen: Slightly disheveled, poor hygiene.  Wakes to voice, opens eyes, mumbles and slurred speech.  HEENT: No signs of trauma, Bilateral external ears normal, Nose normal. Conjunctiva normal, Non-icteric.  Pupils equal bilaterally  Neck:  No apparent tenderness, Supple, No masses  Lymphatic: No cervical lymphadenopathy noted.   Cardiovascular: Regular rate and rhythm, no murmurs.  Capillary refill less than 3 seconds to all extremities, 2+ distal pulses.  Thorax & Lungs: Normal breath sounds, No respiratory distress, No wheezing bilateral chest rise  Abdomen: Bowel sounds normal, Soft, No apparent tenderness, No masses, No pulsatile masses. No Guarding or rebound  Skin: Warm, Dry, No erythema, No rash noted to exposed areas.   Back: No bony tenderness, No CVA tenderness.   Extremities: Intact distal pulses, No edema  Neurologic: Moves all extremities spontaneously, wakes to voice, attempts to converse but speech is slurred.  No facial droop.      LABS  Results for orders placed or performed during the hospital encounter of 04/28/22   DIAGNOSTIC ALCOHOL   Result Value Ref Range    Diagnostic Alcohol 373.5 (H) 0.0 - 10.0 mg/dL       COURSE & MEDICAL DECISION MAKING  Patient arrives for evaluation of what appears to be alcohol intoxication as evidenced by the clinical record, physical exam, and report from EMS.  We will get a diagnostic alcohol from the patient and, if this demonstrates alcohol intoxication, we will simply observe him as he does not appear distressed and does not require any further intervention.  I find no evidence for recent trauma or significant medical issues aside from alcohol abuse.  7:52 PM  Patient awake, alert, states he does not know how he got here.  He states he was drinking  tonight and acknowledges that he was found fairly intoxicated on the sidewalk.  Patient denies any pain at this point and notes he was not trying to hurt himself.  He states he wants to go back to the shelter and is attempting to call somebody who can give him a ride    FINAL IMPRESSION  1. Acute alcoholic intoxication without complication (HCC)        Electronically signed by: Pablo Franklin M.D., 4/28/2022 4:00 PM

## 2022-04-29 NOTE — ED NOTES
Patient educated on discharge instructions, follow up appointments, prescriptions, and home care. Patient verbalized understanding. Patient ambulated well to [facility].

## 2022-05-12 ENCOUNTER — APPOINTMENT (OUTPATIENT)
Dept: RADIOLOGY | Facility: MEDICAL CENTER | Age: 62
End: 2022-05-12
Attending: EMERGENCY MEDICINE
Payer: MEDICAID

## 2022-05-12 ENCOUNTER — HOSPITAL ENCOUNTER (EMERGENCY)
Facility: MEDICAL CENTER | Age: 62
End: 2022-05-12
Attending: EMERGENCY MEDICINE
Payer: MEDICAID

## 2022-05-12 VITALS
TEMPERATURE: 96.8 F | WEIGHT: 135 LBS | OXYGEN SATURATION: 93 % | HEIGHT: 72 IN | RESPIRATION RATE: 16 BRPM | SYSTOLIC BLOOD PRESSURE: 105 MMHG | HEART RATE: 112 BPM | BODY MASS INDEX: 18.28 KG/M2 | DIASTOLIC BLOOD PRESSURE: 68 MMHG

## 2022-05-12 DIAGNOSIS — B85.2 LICE INFESTATION: Primary | ICD-10-CM

## 2022-05-12 DIAGNOSIS — J04.0 LARYNGITIS: ICD-10-CM

## 2022-05-12 PROCEDURE — 99283 EMERGENCY DEPT VISIT LOW MDM: CPT

## 2022-05-12 PROCEDURE — 700102 HCHG RX REV CODE 250 W/ 637 OVERRIDE(OP): Performed by: EMERGENCY MEDICINE

## 2022-05-12 PROCEDURE — A9270 NON-COVERED ITEM OR SERVICE: HCPCS | Performed by: EMERGENCY MEDICINE

## 2022-05-12 RX ORDER — PERMETHRIN 50 MG/G
CREAM TOPICAL ONCE
Status: COMPLETED | OUTPATIENT
Start: 2022-05-12 | End: 2022-05-12

## 2022-05-12 RX ORDER — DEXAMETHASONE 4 MG/1
8 TABLET ORAL ONCE
Status: COMPLETED | OUTPATIENT
Start: 2022-05-12 | End: 2022-05-12

## 2022-05-12 RX ADMIN — DEXAMETHASONE 8 MG: 4 TABLET ORAL at 16:45

## 2022-05-12 RX ADMIN — PERMETHRIN CREAM 5% W/W: 50 CREAM TOPICAL at 16:45

## 2022-05-12 ASSESSMENT — ENCOUNTER SYMPTOMS
SHORTNESS OF BREATH: 1
SORE THROAT: 1
FEVER: 0
CHILLS: 0
COUGH: 1

## 2022-05-12 NOTE — ED PROVIDER NOTES
"ED Provider Note    Scribed for Feng Vieira II, M* by Sue Zamora. 5/12/2022  4:25 PM    Means of Arrival: EMS  History obtained by: patient  Limitations: none    CHIEF COMPLAINT  Chief Complaint   Patient presents with   • Sore Throat     X 3 days, \"I've lost my voice\", pt denies fevers, pt is living at a homeless shelter currently and has had contact with 'sick' people, no respiratory distress at triage, VSS on RA. Pt is not vaccinated against covid.        HPI  Heriberto Jenkins is a 61 y.o. male who presents to the Emergency Department for evaluation of a sore throat onset five days ago. Initially his symptoms begun with a cough then he developed a sore throat. He lost his voice secondary to his sore throat. He has some mild shortness of breath. Heriberto states that he has bugs all over him. He denies fever or chills. No alleviating or exacerbating factors were reported. He does smoke cigarettes daily.     REVIEW OF SYSTEMS  Review of Systems   Constitutional: Negative for chills and fever.        Positive for hoarse voice   HENT: Positive for sore throat.    Respiratory: Positive for cough and shortness of breath.    See HPI for further details.     PAST MEDICAL HISTORY   has a past medical history of Pericarditis (1/12/2017).    SOCIAL HISTORY  Social History     Tobacco Use   • Smoking status: Current Every Day Smoker     Packs/day: 2.00     Types: Cigarettes   • Smokeless tobacco: Never Used   Vaping Use   • Vaping Use: Never used   Substance and Sexual Activity   • Alcohol use: Yes     Comment: 2- 32 oz beers and a few shots of vodka daily   • Drug use: Not Currently   • Sexual activity: Not reported       SURGICAL HISTORY   has a past surgical history that includes hernia repair; other; submandible abscess incision and drainage (3/25/2012); and dental extraction(s) (3/25/2012).    CURRENT MEDICATIONS  Home Medications     Reviewed by Ronnie He R.N. (Registered Nurse) on 05/12/22 at " 1517  Med List Status: <None>   Medication Last Dose Status   permethrin (ELIMITE) 5 % Cream  Active                ALLERGIES  No Known Allergies    PHYSICAL EXAM  VITAL SIGNS: /68   Pulse (!) 112   Temp 36 °C (96.8 °F) (Oral)   Resp 16   Ht 1.829 m (6')   Wt 61.2 kg (135 lb)   SpO2 93%   BMI 18.31 kg/m²    Pulse ox interpretation: I interpret this pulse ox as borderline  Constitutional: Disheveled 61 y.o. in no apparent distress.  HENT: Normocephalic, Atraumatic, Bilateral external ears normal. Nose normal. Posterior pharynx had no redness edema or exudates. Hoarse but no muffled voice  Neck: Supple, no stridor.   Eyes: Pupils are equal. Conjunctiva normal, non-icteric.   Heart: Regular rate and rhythm, no murmurs.    Lungs: Normal respiratory effort Clear to auscultation bilaterally.  Abdomen: Normal appearance, nondistended, nontender.  Skin: Lice  Neurologic: Alert, Grossly non-focal.   MSK: moving all extremities  Psychiatric:  Appears appropriate and not intoxicated.     COURSE & MEDICAL DECISION MAKING  Pertinent Labs & Imaging studies reviewed. (See chart for details)    4:25 PM This is an emergent evaluation of a 61 y.o., male who presents with a sore throat and hoarse voice and the differential diagnosis includes but is not limited to post viral laryngitis lower suspicion for obstructing mass but will obtain DX-Neck Soft tissue to look for any narrowing. Patient will be treated with permethrin for insect infestation. He will be treated with Decadron 8 mg tablet for laryngitis.    6:14 PM His nurse informed me that she saw him running out of the ED. He was not given a shower and did not receive permethrin. DX-neck cancelled.     Patient eloped     DISPOSITION:  Eloped    FOLLOW UP:  No follow-up provider specified.    FINAL IMPRESSION  1. Lice infestation Active   2. Laryngitis           I, Sue Zamora (Scribe), am scribing for, and in the presence of, Feng Vieira II,  BRENDA*.    Electronically signed by: Sue Zamora (Scribe), 5/12/2022    IFeng II, M* personally performed the services described in this documentation, as scribed by Sue Zamora in my presence, and it is both accurate and complete.    The note accurately reflects work and decisions made by me.  Feng Vieira II, M.D.  5/12/2022  11:05 PM

## 2022-05-12 NOTE — ED NOTES
Pt undressing hallway, kindly asked patient to return to room and put his pants back on. Pt refusing to wear his pants. Pt relocated to his room. Door closed due to patient has lice.

## 2022-05-12 NOTE — ED TRIAGE NOTES
"Chief Complaint   Patient presents with   • Sore Throat     X 3 days, \"I've lost my voice\", pt denies fevers, pt is living at a homeless shelter currently and has had contact with 'sick' people, no respiratory distress at triage, VSS on RA. Pt is not vaccinated against covid.      Pt BIB EMS for above complaint, VSS on RA, GCS 15, NAD.    Pt admits to ETOH today, pt is daily drinker, denies hx of withdrawal. Behavior appropriate at triage.    Pt returned to lobby. Educated on triage process and to inform staff of any changes.     /68   Pulse (!) 112   Temp 36 °C (96.8 °F) (Oral)   Resp 16   Ht 1.829 m (6')   Wt 61.2 kg (135 lb)   SpO2 93%   BMI 18.31 kg/m²     "

## 2022-05-13 NOTE — ED NOTES
Pt left room cursing. Attempted to encourage patient. Pt ran out of room and to ER lobby to leave. ERP notified.

## 2022-07-03 ENCOUNTER — HOSPITAL ENCOUNTER (EMERGENCY)
Facility: MEDICAL CENTER | Age: 62
End: 2022-07-03
Payer: MEDICAID

## 2022-07-03 VITALS
RESPIRATION RATE: 16 BRPM | OXYGEN SATURATION: 95 % | HEIGHT: 72 IN | SYSTOLIC BLOOD PRESSURE: 109 MMHG | DIASTOLIC BLOOD PRESSURE: 80 MMHG | BODY MASS INDEX: 18.28 KG/M2 | TEMPERATURE: 98.5 F | HEART RATE: 103 BPM | WEIGHT: 135 LBS

## 2022-07-03 PROCEDURE — 302449 STATCHG TRIAGE ONLY (STATISTIC)

## 2022-07-03 NOTE — ED NOTES
Pt left the ED before being seen by MD. Risks of leaving before evaluation were discussed and pt knows to return for any new/concerning symptoms.

## 2022-07-03 NOTE — ED TRIAGE NOTES
.  Chief Complaint   Patient presents with   • Alcohol Intoxication     VITA AKBAR. Patient endorses drinking today. Patient does not know why he is in the hospital.        60 yo male VITA AKBAR wheeled to triage for above complaint.      Pt is alert and oriented x 3, speaking in full sentences with mild slurring, follows commands and responds appropriately to questions.      Patient placed back in lobby and educated on triage process. Asked to inform RN of any changes.    /80   Pulse (!) 103   Temp 36.9 °C (98.5 °F) (Temporal)   Resp 16   Ht 1.829 m (6')   Wt 61.2 kg (135 lb)   SpO2 95%   BMI 18.31 kg/m²

## 2022-07-15 ENCOUNTER — HOSPITAL ENCOUNTER (EMERGENCY)
Facility: MEDICAL CENTER | Age: 62
End: 2022-07-15
Attending: EMERGENCY MEDICINE
Payer: MEDICAID

## 2022-07-15 VITALS
RESPIRATION RATE: 18 BRPM | HEIGHT: 73 IN | WEIGHT: 135 LBS | SYSTOLIC BLOOD PRESSURE: 102 MMHG | OXYGEN SATURATION: 96 % | BODY MASS INDEX: 17.89 KG/M2 | DIASTOLIC BLOOD PRESSURE: 64 MMHG | HEART RATE: 100 BPM | TEMPERATURE: 97.3 F

## 2022-07-15 DIAGNOSIS — F10.10 ALCOHOL ABUSE: ICD-10-CM

## 2022-07-15 DIAGNOSIS — F10.920 ALCOHOLIC INTOXICATION WITHOUT COMPLICATION (HCC): ICD-10-CM

## 2022-07-15 DIAGNOSIS — F43.21 SITUATIONAL DEPRESSION: ICD-10-CM

## 2022-07-15 PROCEDURE — 99285 EMERGENCY DEPT VISIT HI MDM: CPT

## 2022-07-15 NOTE — ED TRIAGE NOTES
Chief Complaint   Patient presents with   • Suicidal Ideation     Pt found to be intoxicated and stating he is suicidal with a plan (will walk into traffic).  No H/O suicide per his report/.   • Alcohol Intoxication     Pt states he drinks everyday.  Breathalyzer=.261     All clothing removed.  Pt Breathalyzer =.261

## 2022-07-15 NOTE — ED PROVIDER NOTES
"ED Provider Note    CHIEF COMPLAINT  Chief Complaint   Patient presents with   • Suicidal Ideation     Pt found to be intoxicated and stating he is suicidal with a plan (will walk into traffic).  No H/O suicide per his report/.   • Alcohol Intoxication     Pt states he drinks everyday.  Breathalyzer=.261       HPI  Heriberto Jenkins is a 61 y.o. male who presents for evaluation of alcohol intoxication.  Admits he feels suicidal but to me does not report any specific plan, he really cannot tell me why feels suicidal.  He denies a headache or head injury, no chest pain or back pain or abdominal pain.  No vomiting or fever.  Reports he drank a lot of alcohol.  No other complaints offered at this time    REVIEW OF SYSTEMS  Negative for fever, rash, chest pain, dyspnea, abdominal pain, back pain. All other systems are negative.     PAST MEDICAL HISTORY   has a past medical history of Pericarditis (1/12/2017).     FAMILY HISTORY  Patient denies any family history    SOCIAL HISTORY  Social History     Tobacco Use   • Smoking status: Current Every Day Smoker     Packs/day: 2.00     Types: Cigarettes   • Smokeless tobacco: Never Used   Vaping Use   • Vaping Use: Never used   Substance and Sexual Activity   • Alcohol use: Yes     Comment: 2- 32 oz beers and a few shots of vodka daily   • Drug use: Not Currently   • Sexual activity: Not on file       SURGICAL HISTORY   has a past surgical history that includes hernia repair; other; submandible abscess incision and drainage (3/25/2012); and dental extraction(s) (3/25/2012).    CURRENT MEDICATIONS  I personally reviewed the medication list in the charting documentation.     ALLERGIES  No Known Allergies    PHYSICAL EXAM  VITAL SIGNS: BP (!) 97/69   Pulse (!) 113   Temp 36.3 °C (97.3 °F) (Temporal)   Resp 18   Ht 1.854 m (6' 1\")   Wt 61.2 kg (135 lb)   SpO2 96%   BMI 17.81 kg/m²   Constitutional: Sleepy, disheveled, dirty, easily arousable, awake and alert  HENT: " Normocephalic, no obvious evidence of acute trauma.   Neck: Comfortable movement without any obvious restriction in the range of motion.  Eyes: Conjunctiva normal, Non-icteric.   Chest: Normal nonlabored respirations.  Skin: The exposed portions of skin reveal no obvious rash or other abnormalities.  Musculoskeletal: No obvious restriction in the range of motion in all major joints.   Neurologic: Once awake he is alert, No obvious focal deficits noted.     DIAGNOSTIC STUDIES / PROCEDURES    LABS/EKGs  Results for orders placed or performed during the hospital encounter of 04/28/22   DIAGNOSTIC ALCOHOL   Result Value Ref Range    Diagnostic Alcohol 373.5 (H) 0.0 - 10.0 mg/dL        COURSE & MEDICAL DECISION MAKING  Pertinent Labs & Imaging studies reviewed. (See chart for details)    Encounter Summary: This is a very pleasant 61 y.o. male who unfortunately required evaluation in the emergency department today with alcohol intoxication, initially was admitting to some suicidal thoughts, reported a plan to nursing staff, could not describe any specific plan to me.  Regardless he has no physical complaints.  No acute physical findings on exam.  He will be observed until sober at which point he will be reevaluated for the need for psychiatric evaluation    4:48 PM 07/15/22 patient is admitted into ED observation pending sobriety for reevaluation and possible psychiatric evaluation    10:42 PM patient is reevaluated.  Clinically he has sobered.  He is awake, alert, he is actually very pleasant.  He denies any suicidal thought, he just tells me that he has an alcohol problem and would like rest, he does not want to drink anymore.  We have our peer recovery support representatives here in the emergency department, they are going to see the patient and offer him some resources.  From our perspective in the emergency department he is good for discharge, clinically sober, not suicidal, no acute complaints otherwise.  At this  point he is being discharged from ED observation    DISPOSITION: Discharge Home      FINAL IMPRESSION  1. Alcoholic intoxication without complication (HCC)    2. Situational depression    3. Alcohol abuse        This dictation was created using voice recognition software. The accuracy of the dictation is limited to the abilities of the software. I expect there may be some errors of grammar and possibly content. The nursing notes were reviewed and certain aspects of this information were incorporated into this note.    Electronically signed by: Markie Hansen M.D., 7/15/2022 4:43 PM

## 2022-07-16 NOTE — ED NOTES
Discharge education provided. Discharge paperwork signed by pt. Prescription to be picked up by pt. All questions answered. All belongings with pt. Pt ambulated to lobby unassisted with steady gait.  Pt will be taken to [facility]

## 2022-07-16 NOTE — ED NOTES
Unable to address med rec; patient not participatory in interview; pharmacy on file, [facility] on [address] ([phone]), states patient has not filled any medications with their pharmacy since August 2021.